# Patient Record
Sex: MALE | Race: WHITE
[De-identification: names, ages, dates, MRNs, and addresses within clinical notes are randomized per-mention and may not be internally consistent; named-entity substitution may affect disease eponyms.]

---

## 2021-04-06 ENCOUNTER — HOSPITAL ENCOUNTER (INPATIENT)
Dept: HOSPITAL 74 - YASAS | Age: 41
LOS: 4 days | Discharge: OTHER INSTITUTION | DRG: 773 | End: 2021-04-10
Attending: ALLERGY & IMMUNOLOGY | Admitting: ALLERGY & IMMUNOLOGY
Payer: COMMERCIAL

## 2021-04-06 VITALS — BODY MASS INDEX: 24.4 KG/M2

## 2021-04-06 DIAGNOSIS — F17.210: ICD-10-CM

## 2021-04-06 DIAGNOSIS — F19.24: ICD-10-CM

## 2021-04-06 DIAGNOSIS — F19.282: ICD-10-CM

## 2021-04-06 DIAGNOSIS — G40.909: ICD-10-CM

## 2021-04-06 DIAGNOSIS — F13.230: Primary | ICD-10-CM

## 2021-04-06 DIAGNOSIS — F14.20: ICD-10-CM

## 2021-04-06 DIAGNOSIS — F12.20: ICD-10-CM

## 2021-04-06 DIAGNOSIS — F19.280: ICD-10-CM

## 2021-04-06 DIAGNOSIS — F11.20: ICD-10-CM

## 2021-04-06 PROCEDURE — C9803 HOPD COVID-19 SPEC COLLECT: HCPCS

## 2021-04-06 PROCEDURE — U0005 INFEC AGEN DETEC AMPLI PROBE: HCPCS

## 2021-04-06 PROCEDURE — U0003 INFECTIOUS AGENT DETECTION BY NUCLEIC ACID (DNA OR RNA); SEVERE ACUTE RESPIRATORY SYNDROME CORONAVIRUS 2 (SARS-COV-2) (CORONAVIRUS DISEASE [COVID-19]), AMPLIFIED PROBE TECHNIQUE, MAKING USE OF HIGH THROUGHPUT TECHNOLOGIES AS DESCRIBED BY CMS-2020-01-R: HCPCS

## 2021-04-07 LAB
ALBUMIN SERPL-MCNC: 3.7 G/DL (ref 3.4–5)
ALP SERPL-CCNC: 66 U/L (ref 45–117)
ALT SERPL-CCNC: 19 U/L (ref 13–61)
ANION GAP SERPL CALC-SCNC: 7 MMOL/L (ref 8–16)
AST SERPL-CCNC: 11 U/L (ref 15–37)
BILIRUB SERPL-MCNC: 0.8 MG/DL (ref 0.2–1)
BUN SERPL-MCNC: 17.4 MG/DL (ref 7–18)
CALCIUM SERPL-MCNC: 8.8 MG/DL (ref 8.5–10.1)
CHLORIDE SERPL-SCNC: 103 MMOL/L (ref 98–107)
CO2 SERPL-SCNC: 29 MMOL/L (ref 21–32)
CREAT SERPL-MCNC: 0.9 MG/DL (ref 0.55–1.3)
DEPRECATED RDW RBC AUTO: 14.3 % (ref 11.9–15.9)
GLUCOSE SERPL-MCNC: 88 MG/DL (ref 74–106)
HCT VFR BLD CALC: 36.2 % (ref 35.4–49)
HGB BLD-MCNC: 12.3 GM/DL (ref 11.7–16.9)
MCH RBC QN AUTO: 28.3 PG (ref 25.7–33.7)
MCHC RBC AUTO-ENTMCNC: 34 G/DL (ref 32–35.9)
MCV RBC: 83.3 FL (ref 80–96)
PLATELET # BLD AUTO: 189 K/MM3 (ref 134–434)
PMV BLD: 9.7 FL (ref 7.5–11.1)
PROT SERPL-MCNC: 7 G/DL (ref 6.4–8.2)
RBC # BLD AUTO: 4.35 M/MM3 (ref 4–5.6)
SODIUM SERPL-SCNC: 139 MMOL/L (ref 136–145)
WBC # BLD AUTO: 5.9 K/MM3 (ref 4–10)

## 2021-04-07 PROCEDURE — HZ2ZZZZ DETOXIFICATION SERVICES FOR SUBSTANCE ABUSE TREATMENT: ICD-10-PCS | Performed by: ALLERGY & IMMUNOLOGY

## 2021-04-07 RX ADMIN — Medication SCH MG: at 21:05

## 2021-04-07 RX ADMIN — Medication SCH MG: at 22:05

## 2021-04-07 RX ADMIN — Medication SCH TAB: at 10:08

## 2021-04-08 RX ADMIN — BUPRENORPHINE HYDROCHLORIDE, NALOXONE HYDROCHLORIDE SCH EACH: 8; 2 FILM, SOLUBLE BUCCAL; SUBLINGUAL at 13:23

## 2021-04-08 RX ADMIN — BUPRENORPHINE HYDROCHLORIDE, NALOXONE HYDROCHLORIDE SCH EACH: 8; 2 FILM, SOLUBLE BUCCAL; SUBLINGUAL at 05:29

## 2021-04-08 RX ADMIN — Medication SCH MG: at 22:26

## 2021-04-08 RX ADMIN — BUPRENORPHINE HYDROCHLORIDE, NALOXONE HYDROCHLORIDE SCH EACH: 8; 2 FILM, SOLUBLE BUCCAL; SUBLINGUAL at 19:51

## 2021-04-08 RX ADMIN — Medication SCH MG: at 22:27

## 2021-04-08 RX ADMIN — Medication SCH TAB: at 10:07

## 2021-04-09 RX ADMIN — Medication SCH TAB: at 10:00

## 2021-04-09 RX ADMIN — Medication SCH MG: at 22:12

## 2021-04-09 RX ADMIN — BUPRENORPHINE HYDROCHLORIDE, NALOXONE HYDROCHLORIDE SCH EACH: 8; 2 FILM, SOLUBLE BUCCAL; SUBLINGUAL at 13:14

## 2021-04-09 RX ADMIN — Medication SCH MG: at 22:11

## 2021-04-09 RX ADMIN — BUPRENORPHINE HYDROCHLORIDE, NALOXONE HYDROCHLORIDE SCH EACH: 8; 2 FILM, SOLUBLE BUCCAL; SUBLINGUAL at 05:39

## 2021-04-09 RX ADMIN — BUPRENORPHINE HYDROCHLORIDE, NALOXONE HYDROCHLORIDE SCH EACH: 8; 2 FILM, SOLUBLE BUCCAL; SUBLINGUAL at 19:16

## 2021-04-10 ENCOUNTER — HOSPITAL ENCOUNTER (INPATIENT)
Dept: HOSPITAL 74 - YASAS | Age: 41
LOS: 12 days | Discharge: HOME | DRG: 772 | End: 2021-04-22
Attending: ALLERGY & IMMUNOLOGY | Admitting: ALLERGY & IMMUNOLOGY
Payer: COMMERCIAL

## 2021-04-10 VITALS — TEMPERATURE: 97.7 F | DIASTOLIC BLOOD PRESSURE: 79 MMHG | HEART RATE: 71 BPM | SYSTOLIC BLOOD PRESSURE: 138 MMHG

## 2021-04-10 DIAGNOSIS — F13.20: ICD-10-CM

## 2021-04-10 DIAGNOSIS — F12.20: ICD-10-CM

## 2021-04-10 DIAGNOSIS — F19.280: ICD-10-CM

## 2021-04-10 DIAGNOSIS — Z56.0: ICD-10-CM

## 2021-04-10 DIAGNOSIS — F32.9: ICD-10-CM

## 2021-04-10 DIAGNOSIS — F11.20: Primary | ICD-10-CM

## 2021-04-10 DIAGNOSIS — F41.9: ICD-10-CM

## 2021-04-10 DIAGNOSIS — F14.20: ICD-10-CM

## 2021-04-10 DIAGNOSIS — F19.282: ICD-10-CM

## 2021-04-10 PROCEDURE — U0005 INFEC AGEN DETEC AMPLI PROBE: HCPCS

## 2021-04-10 PROCEDURE — HZ42ZZZ GROUP COUNSELING FOR SUBSTANCE ABUSE TREATMENT, COGNITIVE-BEHAVIORAL: ICD-10-PCS | Performed by: ALLERGY & IMMUNOLOGY

## 2021-04-10 PROCEDURE — U0003 INFECTIOUS AGENT DETECTION BY NUCLEIC ACID (DNA OR RNA); SEVERE ACUTE RESPIRATORY SYNDROME CORONAVIRUS 2 (SARS-COV-2) (CORONAVIRUS DISEASE [COVID-19]), AMPLIFIED PROBE TECHNIQUE, MAKING USE OF HIGH THROUGHPUT TECHNOLOGIES AS DESCRIBED BY CMS-2020-01-R: HCPCS

## 2021-04-10 PROCEDURE — C9803 HOPD COVID-19 SPEC COLLECT: HCPCS

## 2021-04-10 RX ADMIN — BUPRENORPHINE HYDROCHLORIDE, NALOXONE HYDROCHLORIDE SCH EACH: 8; 2 FILM, SOLUBLE BUCCAL; SUBLINGUAL at 15:02

## 2021-04-10 RX ADMIN — BUPRENORPHINE HYDROCHLORIDE, NALOXONE HYDROCHLORIDE SCH EACH: 8; 2 FILM, SOLUBLE BUCCAL; SUBLINGUAL at 05:43

## 2021-04-10 RX ADMIN — HYDROXYZINE PAMOATE PRN MG: 25 CAPSULE ORAL at 21:14

## 2021-04-10 RX ADMIN — BUPRENORPHINE HYDROCHLORIDE, NALOXONE HYDROCHLORIDE SCH EACH: 8; 2 FILM, SOLUBLE BUCCAL; SUBLINGUAL at 21:13

## 2021-04-10 RX ADMIN — Medication SCH MG: at 21:13

## 2021-04-10 RX ADMIN — Medication SCH TAB: at 10:06

## 2021-04-10 RX ADMIN — Medication SCH MG: at 21:14

## 2021-04-10 SDOH — ECONOMIC STABILITY - INCOME SECURITY: UNEMPLOYMENT, UNSPECIFIED: Z56.0

## 2021-04-11 RX ADMIN — Medication SCH MG: at 21:08

## 2021-04-11 RX ADMIN — NICOTINE SCH: 7 PATCH TRANSDERMAL at 09:25

## 2021-04-11 RX ADMIN — ACETAMINOPHEN PRN MG: 325 TABLET ORAL at 15:43

## 2021-04-11 RX ADMIN — Medication SCH TAB: at 09:25

## 2021-04-11 RX ADMIN — ACETAMINOPHEN PRN MG: 325 TABLET ORAL at 06:09

## 2021-04-11 RX ADMIN — BUPRENORPHINE HYDROCHLORIDE, NALOXONE HYDROCHLORIDE SCH EACH: 8; 2 FILM, SOLUBLE BUCCAL; SUBLINGUAL at 21:09

## 2021-04-11 RX ADMIN — BUPRENORPHINE HYDROCHLORIDE, NALOXONE HYDROCHLORIDE SCH EACH: 8; 2 FILM, SOLUBLE BUCCAL; SUBLINGUAL at 06:10

## 2021-04-11 RX ADMIN — BUPRENORPHINE HYDROCHLORIDE, NALOXONE HYDROCHLORIDE SCH EACH: 8; 2 FILM, SOLUBLE BUCCAL; SUBLINGUAL at 13:29

## 2021-04-12 RX ADMIN — HYDROXYZINE PAMOATE PRN MG: 25 CAPSULE ORAL at 18:07

## 2021-04-12 RX ADMIN — Medication SCH MG: at 21:30

## 2021-04-12 RX ADMIN — HYDROXYZINE PAMOATE PRN MG: 25 CAPSULE ORAL at 22:11

## 2021-04-12 RX ADMIN — BUPRENORPHINE HYDROCHLORIDE, NALOXONE HYDROCHLORIDE SCH EACH: 8; 2 FILM, SOLUBLE BUCCAL; SUBLINGUAL at 21:30

## 2021-04-12 RX ADMIN — NICOTINE SCH: 7 PATCH TRANSDERMAL at 09:52

## 2021-04-12 RX ADMIN — BUPRENORPHINE HYDROCHLORIDE, NALOXONE HYDROCHLORIDE SCH EACH: 8; 2 FILM, SOLUBLE BUCCAL; SUBLINGUAL at 14:02

## 2021-04-12 RX ADMIN — IBUPROFEN PRN MG: 400 TABLET, FILM COATED ORAL at 09:53

## 2021-04-12 RX ADMIN — HYDROXYZINE PAMOATE PRN MG: 25 CAPSULE ORAL at 14:02

## 2021-04-12 RX ADMIN — Medication SCH TAB: at 09:52

## 2021-04-12 RX ADMIN — BUPRENORPHINE HYDROCHLORIDE, NALOXONE HYDROCHLORIDE SCH EACH: 8; 2 FILM, SOLUBLE BUCCAL; SUBLINGUAL at 06:29

## 2021-04-12 RX ADMIN — IBUPROFEN PRN MG: 400 TABLET, FILM COATED ORAL at 18:07

## 2021-04-12 RX ADMIN — HYDROXYZINE PAMOATE PRN MG: 25 CAPSULE ORAL at 09:53

## 2021-04-12 RX ADMIN — ACETAMINOPHEN PRN MG: 325 TABLET ORAL at 06:29

## 2021-04-13 RX ADMIN — Medication SCH MG: at 21:20

## 2021-04-13 RX ADMIN — HYDROXYZINE PAMOATE PRN MG: 25 CAPSULE ORAL at 09:43

## 2021-04-13 RX ADMIN — Medication SCH MG: at 21:52

## 2021-04-13 RX ADMIN — Medication SCH TAB: at 09:43

## 2021-04-13 RX ADMIN — BUSPIRONE HYDROCHLORIDE SCH MG: 10 TABLET ORAL at 12:00

## 2021-04-13 RX ADMIN — BUPRENORPHINE HYDROCHLORIDE, NALOXONE HYDROCHLORIDE SCH EACH: 8; 2 FILM, SOLUBLE BUCCAL; SUBLINGUAL at 21:20

## 2021-04-13 RX ADMIN — BUSPIRONE HYDROCHLORIDE SCH MG: 10 TABLET ORAL at 21:20

## 2021-04-13 RX ADMIN — BUPRENORPHINE HYDROCHLORIDE, NALOXONE HYDROCHLORIDE SCH EACH: 8; 2 FILM, SOLUBLE BUCCAL; SUBLINGUAL at 06:14

## 2021-04-13 RX ADMIN — BUPRENORPHINE HYDROCHLORIDE, NALOXONE HYDROCHLORIDE SCH EACH: 8; 2 FILM, SOLUBLE BUCCAL; SUBLINGUAL at 13:54

## 2021-04-13 RX ADMIN — NICOTINE SCH: 7 PATCH TRANSDERMAL at 09:43

## 2021-04-13 RX ADMIN — HYDROXYZINE PAMOATE PRN MG: 25 CAPSULE ORAL at 06:13

## 2021-04-13 RX ADMIN — HYDROXYZINE PAMOATE PRN MG: 25 CAPSULE ORAL at 02:03

## 2021-04-13 RX ADMIN — HYDROXYZINE PAMOATE PRN MG: 25 CAPSULE ORAL at 14:54

## 2021-04-14 RX ADMIN — BUPRENORPHINE HYDROCHLORIDE, NALOXONE HYDROCHLORIDE SCH EACH: 8; 2 FILM, SOLUBLE BUCCAL; SUBLINGUAL at 14:38

## 2021-04-14 RX ADMIN — BUSPIRONE HYDROCHLORIDE SCH MG: 10 TABLET ORAL at 21:12

## 2021-04-14 RX ADMIN — NICOTINE SCH: 7 PATCH TRANSDERMAL at 09:42

## 2021-04-14 RX ADMIN — Medication SCH TAB: at 09:41

## 2021-04-14 RX ADMIN — BUPRENORPHINE HYDROCHLORIDE, NALOXONE HYDROCHLORIDE SCH EACH: 8; 2 FILM, SOLUBLE BUCCAL; SUBLINGUAL at 06:40

## 2021-04-14 RX ADMIN — Medication SCH MG: at 21:12

## 2021-04-14 RX ADMIN — HYDROXYZINE PAMOATE PRN MG: 25 CAPSULE ORAL at 11:55

## 2021-04-14 RX ADMIN — BUPRENORPHINE HYDROCHLORIDE, NALOXONE HYDROCHLORIDE SCH EACH: 8; 2 FILM, SOLUBLE BUCCAL; SUBLINGUAL at 21:12

## 2021-04-14 RX ADMIN — HYDROXYZINE PAMOATE PRN MG: 25 CAPSULE ORAL at 16:04

## 2021-04-14 RX ADMIN — HYDROXYZINE PAMOATE PRN MG: 25 CAPSULE ORAL at 06:40

## 2021-04-14 RX ADMIN — BUSPIRONE HYDROCHLORIDE SCH MG: 10 TABLET ORAL at 09:42

## 2021-04-15 RX ADMIN — NICOTINE SCH: 7 PATCH TRANSDERMAL at 09:34

## 2021-04-15 RX ADMIN — HYDROXYZINE PAMOATE PRN MG: 25 CAPSULE ORAL at 14:52

## 2021-04-15 RX ADMIN — Medication SCH MG: at 21:11

## 2021-04-15 RX ADMIN — Medication SCH MG: at 21:12

## 2021-04-15 RX ADMIN — BUSPIRONE HYDROCHLORIDE SCH MG: 10 TABLET ORAL at 21:11

## 2021-04-15 RX ADMIN — Medication SCH TAB: at 09:34

## 2021-04-15 RX ADMIN — IBUPROFEN PRN MG: 400 TABLET, FILM COATED ORAL at 13:19

## 2021-04-15 RX ADMIN — HYDROXYZINE PAMOATE PRN MG: 25 CAPSULE ORAL at 10:43

## 2021-04-15 RX ADMIN — HYDROXYZINE PAMOATE PRN MG: 25 CAPSULE ORAL at 06:44

## 2021-04-15 RX ADMIN — BUSPIRONE HYDROCHLORIDE SCH MG: 10 TABLET ORAL at 09:34

## 2021-04-15 RX ADMIN — HYDROXYZINE PAMOATE PRN MG: 25 CAPSULE ORAL at 20:38

## 2021-04-15 RX ADMIN — HYDROXYZINE PAMOATE PRN MG: 25 CAPSULE ORAL at 02:51

## 2021-04-15 RX ADMIN — BUPRENORPHINE HYDROCHLORIDE, NALOXONE HYDROCHLORIDE SCH EACH: 8; 2 FILM, SOLUBLE BUCCAL; SUBLINGUAL at 06:43

## 2021-04-15 RX ADMIN — BUPRENORPHINE HYDROCHLORIDE, NALOXONE HYDROCHLORIDE SCH EACH: 8; 2 FILM, SOLUBLE BUCCAL; SUBLINGUAL at 21:14

## 2021-04-15 RX ADMIN — BUPRENORPHINE HYDROCHLORIDE, NALOXONE HYDROCHLORIDE SCH EACH: 8; 2 FILM, SOLUBLE BUCCAL; SUBLINGUAL at 13:19

## 2021-04-16 RX ADMIN — BUPRENORPHINE HYDROCHLORIDE, NALOXONE HYDROCHLORIDE SCH EACH: 8; 2 FILM, SOLUBLE BUCCAL; SUBLINGUAL at 14:12

## 2021-04-16 RX ADMIN — HYDROXYZINE PAMOATE PRN MG: 25 CAPSULE ORAL at 01:31

## 2021-04-16 RX ADMIN — BUPRENORPHINE HYDROCHLORIDE, NALOXONE HYDROCHLORIDE SCH EACH: 8; 2 FILM, SOLUBLE BUCCAL; SUBLINGUAL at 21:42

## 2021-04-16 RX ADMIN — NICOTINE SCH: 7 PATCH TRANSDERMAL at 09:30

## 2021-04-16 RX ADMIN — HYDROXYZINE PAMOATE PRN MG: 25 CAPSULE ORAL at 06:08

## 2021-04-16 RX ADMIN — BUPRENORPHINE HYDROCHLORIDE, NALOXONE HYDROCHLORIDE SCH EACH: 8; 2 FILM, SOLUBLE BUCCAL; SUBLINGUAL at 06:08

## 2021-04-16 RX ADMIN — Medication SCH TAB: at 09:30

## 2021-04-16 RX ADMIN — Medication SCH MG: at 21:41

## 2021-04-16 RX ADMIN — BUSPIRONE HYDROCHLORIDE SCH MG: 10 TABLET ORAL at 21:41

## 2021-04-16 RX ADMIN — ALUMINUM HYDROXIDE, MAGNESIUM HYDROXIDE, AND SIMETHICONE PRN ML: 200; 200; 20 SUSPENSION ORAL at 09:31

## 2021-04-16 RX ADMIN — BUSPIRONE HYDROCHLORIDE SCH MG: 10 TABLET ORAL at 09:31

## 2021-04-16 RX ADMIN — HYDROXYZINE PAMOATE PRN MG: 25 CAPSULE ORAL at 10:36

## 2021-04-16 RX ADMIN — HYDROXYZINE PAMOATE PRN MG: 25 CAPSULE ORAL at 17:17

## 2021-04-17 RX ADMIN — BUSPIRONE HYDROCHLORIDE SCH MG: 10 TABLET ORAL at 21:46

## 2021-04-17 RX ADMIN — HYDROXYZINE PAMOATE PRN MG: 25 CAPSULE ORAL at 09:29

## 2021-04-17 RX ADMIN — IBUPROFEN PRN MG: 400 TABLET, FILM COATED ORAL at 09:30

## 2021-04-17 RX ADMIN — BUPRENORPHINE HYDROCHLORIDE, NALOXONE HYDROCHLORIDE SCH EACH: 8; 2 FILM, SOLUBLE BUCCAL; SUBLINGUAL at 06:07

## 2021-04-17 RX ADMIN — HYDROXYZINE PAMOATE PRN MG: 25 CAPSULE ORAL at 14:41

## 2021-04-17 RX ADMIN — HYDROXYZINE PAMOATE PRN MG: 25 CAPSULE ORAL at 06:07

## 2021-04-17 RX ADMIN — NICOTINE SCH: 7 PATCH TRANSDERMAL at 09:30

## 2021-04-17 RX ADMIN — IBUPROFEN PRN MG: 400 TABLET, FILM COATED ORAL at 18:57

## 2021-04-17 RX ADMIN — BUSPIRONE HYDROCHLORIDE SCH MG: 10 TABLET ORAL at 09:29

## 2021-04-17 RX ADMIN — BUPRENORPHINE HYDROCHLORIDE, NALOXONE HYDROCHLORIDE SCH EACH: 8; 2 FILM, SOLUBLE BUCCAL; SUBLINGUAL at 21:49

## 2021-04-17 RX ADMIN — BUPRENORPHINE HYDROCHLORIDE, NALOXONE HYDROCHLORIDE SCH EACH: 8; 2 FILM, SOLUBLE BUCCAL; SUBLINGUAL at 13:02

## 2021-04-17 RX ADMIN — Medication SCH MG: at 21:46

## 2021-04-17 RX ADMIN — HYDROXYZINE PAMOATE PRN MG: 25 CAPSULE ORAL at 18:57

## 2021-04-17 RX ADMIN — HYDROXYZINE PAMOATE PRN MG: 25 CAPSULE ORAL at 01:55

## 2021-04-17 RX ADMIN — Medication SCH TAB: at 09:29

## 2021-04-18 RX ADMIN — IBUPROFEN PRN MG: 400 TABLET, FILM COATED ORAL at 09:48

## 2021-04-18 RX ADMIN — Medication SCH TAB: at 09:48

## 2021-04-18 RX ADMIN — BUPRENORPHINE HYDROCHLORIDE, NALOXONE HYDROCHLORIDE SCH EACH: 8; 2 FILM, SOLUBLE BUCCAL; SUBLINGUAL at 21:13

## 2021-04-18 RX ADMIN — Medication SCH MG: at 21:13

## 2021-04-18 RX ADMIN — NICOTINE SCH: 7 PATCH TRANSDERMAL at 09:48

## 2021-04-18 RX ADMIN — HYDROXYZINE PAMOATE PRN MG: 25 CAPSULE ORAL at 15:40

## 2021-04-18 RX ADMIN — ACETAMINOPHEN PRN MG: 325 TABLET ORAL at 14:17

## 2021-04-18 RX ADMIN — HYDROXYZINE PAMOATE PRN MG: 25 CAPSULE ORAL at 10:54

## 2021-04-18 RX ADMIN — HYDROXYZINE PAMOATE PRN MG: 25 CAPSULE ORAL at 01:31

## 2021-04-18 RX ADMIN — BUSPIRONE HYDROCHLORIDE SCH MG: 10 TABLET ORAL at 21:13

## 2021-04-18 RX ADMIN — BUSPIRONE HYDROCHLORIDE SCH MG: 10 TABLET ORAL at 09:48

## 2021-04-18 RX ADMIN — BUPRENORPHINE HYDROCHLORIDE, NALOXONE HYDROCHLORIDE SCH EACH: 8; 2 FILM, SOLUBLE BUCCAL; SUBLINGUAL at 06:14

## 2021-04-18 RX ADMIN — HYDROXYZINE PAMOATE PRN MG: 25 CAPSULE ORAL at 21:13

## 2021-04-18 RX ADMIN — BUPRENORPHINE HYDROCHLORIDE, NALOXONE HYDROCHLORIDE SCH EACH: 8; 2 FILM, SOLUBLE BUCCAL; SUBLINGUAL at 14:17

## 2021-04-18 RX ADMIN — HYDROXYZINE PAMOATE PRN MG: 25 CAPSULE ORAL at 06:14

## 2021-04-19 RX ADMIN — BUSPIRONE HYDROCHLORIDE SCH MG: 10 TABLET ORAL at 09:41

## 2021-04-19 RX ADMIN — HYDROXYZINE PAMOATE PRN MG: 25 CAPSULE ORAL at 14:04

## 2021-04-19 RX ADMIN — BUSPIRONE HYDROCHLORIDE SCH MG: 10 TABLET ORAL at 21:36

## 2021-04-19 RX ADMIN — IBUPROFEN PRN MG: 400 TABLET, FILM COATED ORAL at 03:49

## 2021-04-19 RX ADMIN — HYDROXYZINE PAMOATE PRN MG: 25 CAPSULE ORAL at 01:23

## 2021-04-19 RX ADMIN — Medication SCH TAB: at 09:41

## 2021-04-19 RX ADMIN — IBUPROFEN PRN MG: 400 TABLET, FILM COATED ORAL at 09:41

## 2021-04-19 RX ADMIN — BUPRENORPHINE HYDROCHLORIDE, NALOXONE HYDROCHLORIDE SCH EACH: 8; 2 FILM, SOLUBLE BUCCAL; SUBLINGUAL at 13:57

## 2021-04-19 RX ADMIN — HYDROXYZINE PAMOATE PRN MG: 25 CAPSULE ORAL at 19:50

## 2021-04-19 RX ADMIN — NICOTINE SCH: 7 PATCH TRANSDERMAL at 10:33

## 2021-04-19 RX ADMIN — HYDROXYZINE PAMOATE PRN MG: 25 CAPSULE ORAL at 09:41

## 2021-04-19 RX ADMIN — Medication SCH MG: at 21:36

## 2021-04-19 RX ADMIN — BUPRENORPHINE HYDROCHLORIDE, NALOXONE HYDROCHLORIDE SCH EACH: 8; 2 FILM, SOLUBLE BUCCAL; SUBLINGUAL at 07:17

## 2021-04-19 RX ADMIN — HYDROXYZINE PAMOATE PRN MG: 25 CAPSULE ORAL at 07:18

## 2021-04-19 RX ADMIN — BUPRENORPHINE HYDROCHLORIDE, NALOXONE HYDROCHLORIDE SCH EACH: 8; 2 FILM, SOLUBLE BUCCAL; SUBLINGUAL at 21:36

## 2021-04-19 RX ADMIN — ACETAMINOPHEN PRN MG: 325 TABLET ORAL at 14:01

## 2021-04-20 RX ADMIN — Medication SCH TAB: at 09:57

## 2021-04-20 RX ADMIN — BUPRENORPHINE HYDROCHLORIDE, NALOXONE HYDROCHLORIDE SCH EACH: 8; 2 FILM, SOLUBLE BUCCAL; SUBLINGUAL at 06:04

## 2021-04-20 RX ADMIN — IBUPROFEN PRN MG: 400 TABLET, FILM COATED ORAL at 06:05

## 2021-04-20 RX ADMIN — BUPRENORPHINE HYDROCHLORIDE, NALOXONE HYDROCHLORIDE SCH EACH: 8; 2 FILM, SOLUBLE BUCCAL; SUBLINGUAL at 22:48

## 2021-04-20 RX ADMIN — HYDROXYZINE PAMOATE PRN MG: 25 CAPSULE ORAL at 01:49

## 2021-04-20 RX ADMIN — ACETAMINOPHEN PRN MG: 325 TABLET ORAL at 18:04

## 2021-04-20 RX ADMIN — BUSPIRONE HYDROCHLORIDE SCH MG: 10 TABLET ORAL at 09:57

## 2021-04-20 RX ADMIN — HYDROXYZINE PAMOATE PRN MG: 25 CAPSULE ORAL at 06:04

## 2021-04-20 RX ADMIN — BUSPIRONE HYDROCHLORIDE SCH MG: 10 TABLET ORAL at 22:50

## 2021-04-20 RX ADMIN — HYDROXYZINE PAMOATE PRN MG: 25 CAPSULE ORAL at 09:58

## 2021-04-20 RX ADMIN — IBUPROFEN PRN MG: 400 TABLET, FILM COATED ORAL at 12:50

## 2021-04-20 RX ADMIN — HYDROXYZINE PAMOATE PRN MG: 25 CAPSULE ORAL at 18:05

## 2021-04-20 RX ADMIN — BUPRENORPHINE HYDROCHLORIDE, NALOXONE HYDROCHLORIDE SCH: 8; 2 FILM, SOLUBLE BUCCAL; SUBLINGUAL at 21:34

## 2021-04-20 RX ADMIN — Medication SCH MG: at 21:33

## 2021-04-20 RX ADMIN — BUSPIRONE HYDROCHLORIDE SCH MG: 10 TABLET ORAL at 21:33

## 2021-04-20 RX ADMIN — ALUMINUM HYDROXIDE, MAGNESIUM HYDROXIDE, AND SIMETHICONE PRN ML: 200; 200; 20 SUSPENSION ORAL at 10:32

## 2021-04-20 RX ADMIN — QUETIAPINE FUMARATE PRN MG: 100 TABLET ORAL at 22:51

## 2021-04-20 RX ADMIN — Medication SCH MG: at 22:53

## 2021-04-20 RX ADMIN — BUPRENORPHINE HYDROCHLORIDE, NALOXONE HYDROCHLORIDE SCH EACH: 8; 2 FILM, SOLUBLE BUCCAL; SUBLINGUAL at 13:54

## 2021-04-20 RX ADMIN — NICOTINE SCH: 7 PATCH TRANSDERMAL at 09:57

## 2021-04-20 RX ADMIN — Medication SCH MG: at 22:51

## 2021-04-21 RX ADMIN — IBUPROFEN PRN MG: 400 TABLET, FILM COATED ORAL at 14:37

## 2021-04-21 RX ADMIN — HYDROXYZINE PAMOATE PRN MG: 25 CAPSULE ORAL at 21:13

## 2021-04-21 RX ADMIN — BUSPIRONE HYDROCHLORIDE SCH MG: 10 TABLET ORAL at 10:06

## 2021-04-21 RX ADMIN — BUSPIRONE HYDROCHLORIDE SCH MG: 10 TABLET ORAL at 21:13

## 2021-04-21 RX ADMIN — BUPRENORPHINE HYDROCHLORIDE, NALOXONE HYDROCHLORIDE SCH EACH: 8; 2 FILM, SOLUBLE BUCCAL; SUBLINGUAL at 18:01

## 2021-04-21 RX ADMIN — HYDROXYZINE PAMOATE PRN MG: 25 CAPSULE ORAL at 14:38

## 2021-04-21 RX ADMIN — Medication SCH TAB: at 10:04

## 2021-04-21 RX ADMIN — NICOTINE SCH: 7 PATCH TRANSDERMAL at 10:06

## 2021-04-21 RX ADMIN — QUETIAPINE FUMARATE PRN MG: 100 TABLET ORAL at 21:13

## 2021-04-21 RX ADMIN — Medication SCH MG: at 21:13

## 2021-04-21 RX ADMIN — BUPRENORPHINE HYDROCHLORIDE, NALOXONE HYDROCHLORIDE SCH EACH: 8; 2 FILM, SOLUBLE BUCCAL; SUBLINGUAL at 10:04

## 2021-04-22 VITALS — HEART RATE: 95 BPM | SYSTOLIC BLOOD PRESSURE: 127 MMHG | DIASTOLIC BLOOD PRESSURE: 68 MMHG

## 2021-04-22 VITALS — TEMPERATURE: 97.9 F

## 2021-04-22 RX ADMIN — Medication SCH TAB: at 09:08

## 2021-04-22 RX ADMIN — NICOTINE SCH: 7 PATCH TRANSDERMAL at 09:08

## 2021-04-22 RX ADMIN — BUSPIRONE HYDROCHLORIDE SCH MG: 10 TABLET ORAL at 09:08

## 2021-04-22 RX ADMIN — HYDROXYZINE PAMOATE PRN MG: 25 CAPSULE ORAL at 05:55

## 2024-09-04 ENCOUNTER — APPOINTMENT (OUTPATIENT)
Dept: NEUROLOGY | Facility: HOSPITAL | Age: 44
End: 2024-09-04
Payer: COMMERCIAL

## 2024-09-04 ENCOUNTER — APPOINTMENT (OUTPATIENT)
Dept: RADIOLOGY | Facility: HOSPITAL | Age: 44
End: 2024-09-04
Payer: COMMERCIAL

## 2024-09-04 ENCOUNTER — APPOINTMENT (OUTPATIENT)
Dept: CARDIOLOGY | Facility: HOSPITAL | Age: 44
End: 2024-09-04
Payer: COMMERCIAL

## 2024-09-04 ENCOUNTER — HOSPITAL ENCOUNTER (OUTPATIENT)
Facility: HOSPITAL | Age: 44
Setting detail: OBSERVATION
Discharge: HOME | End: 2024-09-05
Attending: EMERGENCY MEDICINE | Admitting: INTERNAL MEDICINE
Payer: COMMERCIAL

## 2024-09-04 DIAGNOSIS — R56.9 SEIZURE (MULTI): ICD-10-CM

## 2024-09-04 DIAGNOSIS — R56.9 SEIZURE-LIKE ACTIVITY (MULTI): Primary | ICD-10-CM

## 2024-09-04 DIAGNOSIS — R79.89 ELEVATED TROPONIN: ICD-10-CM

## 2024-09-04 LAB
ALBUMIN SERPL BCP-MCNC: 4 G/DL (ref 3.4–5)
ALP SERPL-CCNC: 56 U/L (ref 33–120)
ALT SERPL W P-5'-P-CCNC: 28 U/L (ref 10–52)
ANION GAP BLDV CALCULATED.4IONS-SCNC: 10 MMOL/L (ref 10–25)
ANION GAP SERPL CALC-SCNC: 13 MMOL/L (ref 10–20)
APPEARANCE UR: CLEAR
AST SERPL W P-5'-P-CCNC: 29 U/L (ref 9–39)
BASE EXCESS BLDV CALC-SCNC: 1.3 MMOL/L (ref -2–3)
BASOPHILS # BLD AUTO: 0.03 X10*3/UL (ref 0–0.1)
BASOPHILS NFR BLD AUTO: 0.3 %
BILIRUB SERPL-MCNC: 0.6 MG/DL (ref 0–1.2)
BILIRUB UR STRIP.AUTO-MCNC: NEGATIVE MG/DL
BODY TEMPERATURE: 37 DEGREES CELSIUS
BUN SERPL-MCNC: 15 MG/DL (ref 6–23)
CA-I BLDV-SCNC: 1.19 MMOL/L (ref 1.1–1.33)
CALCIUM SERPL-MCNC: 8.5 MG/DL (ref 8.6–10.3)
CARDIAC TROPONIN I PNL SERPL HS: 18 NG/L (ref 0–20)
CARDIAC TROPONIN I PNL SERPL HS: 25 NG/L (ref 0–20)
CARDIAC TROPONIN I PNL SERPL HS: 31 NG/L (ref 0–20)
CHLORIDE BLDV-SCNC: 103 MMOL/L (ref 98–107)
CHLORIDE SERPL-SCNC: 102 MMOL/L (ref 98–107)
CK SERPL-CCNC: 302 U/L (ref 0–325)
CO2 SERPL-SCNC: 26 MMOL/L (ref 21–32)
COLOR UR: YELLOW
CREAT SERPL-MCNC: 0.81 MG/DL (ref 0.5–1.3)
EGFRCR SERPLBLD CKD-EPI 2021: >90 ML/MIN/1.73M*2
EOSINOPHIL # BLD AUTO: 0.03 X10*3/UL (ref 0–0.7)
EOSINOPHIL NFR BLD AUTO: 0.3 %
ERYTHROCYTE [DISTWIDTH] IN BLOOD BY AUTOMATED COUNT: 12.9 % (ref 11.5–14.5)
GLUCOSE BLDV-MCNC: 98 MG/DL (ref 74–99)
GLUCOSE SERPL-MCNC: 82 MG/DL (ref 74–99)
GLUCOSE UR STRIP.AUTO-MCNC: NORMAL MG/DL
HCO3 BLDV-SCNC: 27.2 MMOL/L (ref 22–26)
HCT VFR BLD AUTO: 38.7 % (ref 41–52)
HCT VFR BLD EST: 39 % (ref 41–52)
HGB BLD-MCNC: 12.8 G/DL (ref 13.5–17.5)
HGB BLDV-MCNC: 12.9 G/DL (ref 13.5–17.5)
IMM GRANULOCYTES # BLD AUTO: 0.06 X10*3/UL (ref 0–0.7)
IMM GRANULOCYTES NFR BLD AUTO: 0.7 % (ref 0–0.9)
INHALED O2 CONCENTRATION: 21 %
KETONES UR STRIP.AUTO-MCNC: NEGATIVE MG/DL
LACTATE BLDV-SCNC: 2 MMOL/L (ref 0.4–2)
LACTATE SERPL-SCNC: 0.6 MMOL/L (ref 0.4–2)
LACTATE SERPL-SCNC: 2.4 MMOL/L (ref 0.4–2)
LEUKOCYTE ESTERASE UR QL STRIP.AUTO: NEGATIVE
LYMPHOCYTES # BLD AUTO: 1.17 X10*3/UL (ref 1.2–4.8)
LYMPHOCYTES NFR BLD AUTO: 13 %
MAGNESIUM SERPL-MCNC: 1.9 MG/DL (ref 1.6–2.4)
MCH RBC QN AUTO: 28.5 PG (ref 26–34)
MCHC RBC AUTO-ENTMCNC: 33.1 G/DL (ref 32–36)
MCV RBC AUTO: 86 FL (ref 80–100)
MONOCYTES # BLD AUTO: 0.31 X10*3/UL (ref 0.1–1)
MONOCYTES NFR BLD AUTO: 3.4 %
NEUTROPHILS # BLD AUTO: 7.39 X10*3/UL (ref 1.2–7.7)
NEUTROPHILS NFR BLD AUTO: 82.3 %
NITRITE UR QL STRIP.AUTO: NEGATIVE
NRBC BLD-RTO: 0 /100 WBCS (ref 0–0)
OXYHGB MFR BLDV: 57.5 % (ref 45–75)
PCO2 BLDV: 47 MM HG (ref 41–51)
PH BLDV: 7.37 PH (ref 7.33–7.43)
PH UR STRIP.AUTO: 7 [PH]
PLATELET # BLD AUTO: 210 X10*3/UL (ref 150–450)
PO2 BLDV: 38 MM HG (ref 35–45)
POTASSIUM BLDV-SCNC: 4.8 MMOL/L (ref 3.5–5.3)
POTASSIUM SERPL-SCNC: 4.1 MMOL/L (ref 3.5–5.3)
PROT SERPL-MCNC: 6.8 G/DL (ref 6.4–8.2)
PROT UR STRIP.AUTO-MCNC: NEGATIVE MG/DL
RBC # BLD AUTO: 4.49 X10*6/UL (ref 4.5–5.9)
RBC # UR STRIP.AUTO: NEGATIVE /UL
SAO2 % BLDV: 58 % (ref 45–75)
SODIUM BLDV-SCNC: 135 MMOL/L (ref 136–145)
SODIUM SERPL-SCNC: 137 MMOL/L (ref 136–145)
SP GR UR STRIP.AUTO: 1.02
UROBILINOGEN UR STRIP.AUTO-MCNC: NORMAL MG/DL
WBC # BLD AUTO: 9 X10*3/UL (ref 4.4–11.3)

## 2024-09-04 PROCEDURE — 2500000001 HC RX 250 WO HCPCS SELF ADMINISTERED DRUGS (ALT 637 FOR MEDICARE OP): Performed by: EMERGENCY MEDICINE

## 2024-09-04 PROCEDURE — 71045 X-RAY EXAM CHEST 1 VIEW: CPT

## 2024-09-04 PROCEDURE — 84075 ASSAY ALKALINE PHOSPHATASE: CPT | Performed by: EMERGENCY MEDICINE

## 2024-09-04 PROCEDURE — 80051 ELECTROLYTE PANEL: CPT | Performed by: EMERGENCY MEDICINE

## 2024-09-04 PROCEDURE — 85025 COMPLETE CBC W/AUTO DIFF WBC: CPT | Performed by: EMERGENCY MEDICINE

## 2024-09-04 PROCEDURE — 99285 EMERGENCY DEPT VISIT HI MDM: CPT | Mod: 25

## 2024-09-04 PROCEDURE — 99222 1ST HOSP IP/OBS MODERATE 55: CPT

## 2024-09-04 PROCEDURE — 71045 X-RAY EXAM CHEST 1 VIEW: CPT | Performed by: RADIOLOGY

## 2024-09-04 PROCEDURE — 2500000001 HC RX 250 WO HCPCS SELF ADMINISTERED DRUGS (ALT 637 FOR MEDICARE OP)

## 2024-09-04 PROCEDURE — 95819 EEG AWAKE AND ASLEEP: CPT

## 2024-09-04 PROCEDURE — G0378 HOSPITAL OBSERVATION PER HR: HCPCS

## 2024-09-04 PROCEDURE — 36415 COLL VENOUS BLD VENIPUNCTURE: CPT | Performed by: EMERGENCY MEDICINE

## 2024-09-04 PROCEDURE — 84132 ASSAY OF SERUM POTASSIUM: CPT | Performed by: EMERGENCY MEDICINE

## 2024-09-04 PROCEDURE — 2500000004 HC RX 250 GENERAL PHARMACY W/ HCPCS (ALT 636 FOR OP/ED): Performed by: EMERGENCY MEDICINE

## 2024-09-04 PROCEDURE — 84484 ASSAY OF TROPONIN QUANT: CPT

## 2024-09-04 PROCEDURE — 95819 EEG AWAKE AND ASLEEP: CPT | Performed by: PSYCHIATRY & NEUROLOGY

## 2024-09-04 PROCEDURE — 93005 ELECTROCARDIOGRAM TRACING: CPT

## 2024-09-04 PROCEDURE — A9575 INJ GADOTERATE MEGLUMI 0.1ML: HCPCS | Performed by: INTERNAL MEDICINE

## 2024-09-04 PROCEDURE — 83735 ASSAY OF MAGNESIUM: CPT | Performed by: EMERGENCY MEDICINE

## 2024-09-04 PROCEDURE — 84484 ASSAY OF TROPONIN QUANT: CPT | Performed by: EMERGENCY MEDICINE

## 2024-09-04 PROCEDURE — 82550 ASSAY OF CK (CPK): CPT | Performed by: EMERGENCY MEDICINE

## 2024-09-04 PROCEDURE — 81003 URINALYSIS AUTO W/O SCOPE: CPT | Performed by: EMERGENCY MEDICINE

## 2024-09-04 PROCEDURE — 96360 HYDRATION IV INFUSION INIT: CPT

## 2024-09-04 PROCEDURE — 83605 ASSAY OF LACTIC ACID: CPT | Performed by: EMERGENCY MEDICINE

## 2024-09-04 PROCEDURE — 2500000002 HC RX 250 W HCPCS SELF ADMINISTERED DRUGS (ALT 637 FOR MEDICARE OP, ALT 636 FOR OP/ED)

## 2024-09-04 PROCEDURE — 70553 MRI BRAIN STEM W/O & W/DYE: CPT | Performed by: SURGERY

## 2024-09-04 PROCEDURE — 70450 CT HEAD/BRAIN W/O DYE: CPT

## 2024-09-04 PROCEDURE — 2550000001 HC RX 255 CONTRASTS: Performed by: INTERNAL MEDICINE

## 2024-09-04 PROCEDURE — 70450 CT HEAD/BRAIN W/O DYE: CPT | Performed by: RADIOLOGY

## 2024-09-04 PROCEDURE — 70553 MRI BRAIN STEM W/O & W/DYE: CPT

## 2024-09-04 RX ORDER — BUPRENORPHINE AND NALOXONE 8; 2 MG/1; MG/1
1 FILM, SOLUBLE BUCCAL; SUBLINGUAL 3 TIMES DAILY
Status: DISCONTINUED | OUTPATIENT
Start: 2024-09-04 | End: 2024-09-05 | Stop reason: HOSPADM

## 2024-09-04 RX ORDER — ALPRAZOLAM 0.5 MG/1
0.5 TABLET ORAL ONCE
Status: COMPLETED | OUTPATIENT
Start: 2024-09-04 | End: 2024-09-04

## 2024-09-04 RX ORDER — GABAPENTIN 400 MG/1
400 CAPSULE ORAL DAILY
COMMUNITY

## 2024-09-04 RX ORDER — ACETAMINOPHEN 650 MG/1
650 SUPPOSITORY RECTAL EVERY 4 HOURS PRN
Status: DISCONTINUED | OUTPATIENT
Start: 2024-09-04 | End: 2024-09-05 | Stop reason: HOSPADM

## 2024-09-04 RX ORDER — BUPRENORPHINE AND NALOXONE 8; 2 MG/1; MG/1
1 FILM, SOLUBLE BUCCAL; SUBLINGUAL 3 TIMES DAILY
COMMUNITY

## 2024-09-04 RX ORDER — DIAZEPAM 5 MG/1
5 TABLET ORAL DAILY
Status: DISCONTINUED | OUTPATIENT
Start: 2024-09-04 | End: 2024-09-04

## 2024-09-04 RX ORDER — ATOMOXETINE 40 MG/1
40 CAPSULE ORAL DAILY
Status: DISCONTINUED | OUTPATIENT
Start: 2024-09-04 | End: 2024-09-05 | Stop reason: HOSPADM

## 2024-09-04 RX ORDER — POLYETHYLENE GLYCOL 3350 17 G/17G
17 POWDER, FOR SOLUTION ORAL DAILY
Status: DISCONTINUED | OUTPATIENT
Start: 2024-09-04 | End: 2024-09-05 | Stop reason: HOSPADM

## 2024-09-04 RX ORDER — ONDANSETRON 4 MG/1
4 TABLET, FILM COATED ORAL EVERY 8 HOURS PRN
Status: DISCONTINUED | OUTPATIENT
Start: 2024-09-04 | End: 2024-09-05 | Stop reason: HOSPADM

## 2024-09-04 RX ORDER — DIAZEPAM 5 MG/1
5 TABLET ORAL DAILY
COMMUNITY

## 2024-09-04 RX ORDER — BUPROPION HYDROCHLORIDE 150 MG/1
150 TABLET ORAL NIGHTLY
Status: DISCONTINUED | OUTPATIENT
Start: 2024-09-04 | End: 2024-09-05 | Stop reason: HOSPADM

## 2024-09-04 RX ORDER — TALC
3 POWDER (GRAM) TOPICAL NIGHTLY PRN
Status: DISCONTINUED | OUTPATIENT
Start: 2024-09-04 | End: 2024-09-05 | Stop reason: HOSPADM

## 2024-09-04 RX ORDER — NAPROXEN SODIUM 220 MG/1
324 TABLET, FILM COATED ORAL ONCE
Status: COMPLETED | OUTPATIENT
Start: 2024-09-04 | End: 2024-09-04

## 2024-09-04 RX ORDER — ACETAMINOPHEN 325 MG/1
650 TABLET ORAL EVERY 4 HOURS PRN
Status: DISCONTINUED | OUTPATIENT
Start: 2024-09-04 | End: 2024-09-05 | Stop reason: HOSPADM

## 2024-09-04 RX ORDER — BUPROPION HYDROCHLORIDE 150 MG/1
150 TABLET ORAL NIGHTLY
COMMUNITY

## 2024-09-04 RX ORDER — MIRTAZAPINE 15 MG/1
15 TABLET, FILM COATED ORAL DAILY
Status: DISCONTINUED | OUTPATIENT
Start: 2024-09-04 | End: 2024-09-05 | Stop reason: HOSPADM

## 2024-09-04 RX ORDER — ALPRAZOLAM 0.5 MG/1
0.5 TABLET ORAL DAILY PRN
Status: DISCONTINUED | OUTPATIENT
Start: 2024-09-04 | End: 2024-09-05 | Stop reason: HOSPADM

## 2024-09-04 RX ORDER — GADOTERATE MEGLUMINE 376.9 MG/ML
20 INJECTION INTRAVENOUS
Status: COMPLETED | OUTPATIENT
Start: 2024-09-04 | End: 2024-09-04

## 2024-09-04 RX ORDER — ONDANSETRON HYDROCHLORIDE 2 MG/ML
4 INJECTION, SOLUTION INTRAVENOUS EVERY 8 HOURS PRN
Status: DISCONTINUED | OUTPATIENT
Start: 2024-09-04 | End: 2024-09-05 | Stop reason: HOSPADM

## 2024-09-04 RX ORDER — MIRTAZAPINE 15 MG/1
15 TABLET, FILM COATED ORAL DAILY
COMMUNITY

## 2024-09-04 RX ORDER — ACETAMINOPHEN 160 MG/5ML
650 SOLUTION ORAL EVERY 4 HOURS PRN
Status: DISCONTINUED | OUTPATIENT
Start: 2024-09-04 | End: 2024-09-05 | Stop reason: HOSPADM

## 2024-09-04 RX ORDER — ATOMOXETINE 40 MG/1
40 CAPSULE ORAL DAILY
COMMUNITY

## 2024-09-04 RX ORDER — GABAPENTIN 400 MG/1
400 CAPSULE ORAL DAILY
Status: DISCONTINUED | OUTPATIENT
Start: 2024-09-04 | End: 2024-09-05 | Stop reason: HOSPADM

## 2024-09-04 SDOH — SOCIAL STABILITY: SOCIAL INSECURITY: HAVE YOU HAD THOUGHTS OF HARMING ANYONE ELSE?: NO

## 2024-09-04 SDOH — SOCIAL STABILITY: SOCIAL INSECURITY: HAVE YOU HAD ANY THOUGHTS OF HARMING ANYONE ELSE?: NO

## 2024-09-04 SDOH — SOCIAL STABILITY: SOCIAL INSECURITY: DO YOU FEEL UNSAFE GOING BACK TO THE PLACE WHERE YOU ARE LIVING?: NO

## 2024-09-04 SDOH — SOCIAL STABILITY: SOCIAL INSECURITY: HAS ANYONE EVER THREATENED TO HURT YOUR FAMILY OR YOUR PETS?: NO

## 2024-09-04 SDOH — SOCIAL STABILITY: SOCIAL INSECURITY: ARE YOU OR HAVE YOU BEEN THREATENED OR ABUSED PHYSICALLY, EMOTIONALLY, OR SEXUALLY BY ANYONE?: NO

## 2024-09-04 SDOH — SOCIAL STABILITY: SOCIAL INSECURITY: ABUSE: ADULT

## 2024-09-04 SDOH — SOCIAL STABILITY: SOCIAL INSECURITY: DO YOU FEEL ANYONE HAS EXPLOITED OR TAKEN ADVANTAGE OF YOU FINANCIALLY OR OF YOUR PERSONAL PROPERTY?: NO

## 2024-09-04 SDOH — SOCIAL STABILITY: SOCIAL INSECURITY: DOES ANYONE TRY TO KEEP YOU FROM HAVING/CONTACTING OTHER FRIENDS OR DOING THINGS OUTSIDE YOUR HOME?: NO

## 2024-09-04 SDOH — SOCIAL STABILITY: SOCIAL INSECURITY: ARE THERE ANY APPARENT SIGNS OF INJURIES/BEHAVIORS THAT COULD BE RELATED TO ABUSE/NEGLECT?: NO

## 2024-09-04 SDOH — SOCIAL STABILITY: SOCIAL INSECURITY: WERE YOU ABLE TO COMPLETE ALL THE BEHAVIORAL HEALTH SCREENINGS?: YES

## 2024-09-04 ASSESSMENT — ENCOUNTER SYMPTOMS
FATIGUE: 0
CHEST TIGHTNESS: 0
LIGHT-HEADEDNESS: 1
ACTIVITY CHANGE: 0
APPETITE CHANGE: 0
DIZZINESS: 1
FEVER: 0
SHORTNESS OF BREATH: 0
BACK PAIN: 1
PALPITATIONS: 0
NAUSEA: 0
VOMITING: 0

## 2024-09-04 ASSESSMENT — ACTIVITIES OF DAILY LIVING (ADL)
PATIENT'S MEMORY ADEQUATE TO SAFELY COMPLETE DAILY ACTIVITIES?: YES
GROOMING: INDEPENDENT
HEARING - LEFT EAR: FUNCTIONAL
TOILETING: INDEPENDENT
BATHING: INDEPENDENT
WALKS IN HOME: INDEPENDENT
JUDGMENT_ADEQUATE_SAFELY_COMPLETE_DAILY_ACTIVITIES: YES
FEEDING YOURSELF: INDEPENDENT
DRESSING YOURSELF: INDEPENDENT
ADEQUATE_TO_COMPLETE_ADL: YES
HEARING - RIGHT EAR: FUNCTIONAL
LACK_OF_TRANSPORTATION: NO

## 2024-09-04 ASSESSMENT — PATIENT HEALTH QUESTIONNAIRE - PHQ9
1. LITTLE INTEREST OR PLEASURE IN DOING THINGS: NOT AT ALL
SUM OF ALL RESPONSES TO PHQ9 QUESTIONS 1 & 2: 0
2. FEELING DOWN, DEPRESSED OR HOPELESS: NOT AT ALL

## 2024-09-04 ASSESSMENT — COGNITIVE AND FUNCTIONAL STATUS - GENERAL
PATIENT BASELINE BEDBOUND: NO
DAILY ACTIVITIY SCORE: 24
MOBILITY SCORE: 24

## 2024-09-04 ASSESSMENT — LIFESTYLE VARIABLES
SUBSTANCE_ABUSE_PAST_12_MONTHS: YES
AUDIT-C TOTAL SCORE: 0
PRESCIPTION_ABUSE_PAST_12_MONTHS: NO
AUDIT-C TOTAL SCORE: 0
HOW OFTEN DO YOU HAVE 6 OR MORE DRINKS ON ONE OCCASION: NEVER
HOW OFTEN DO YOU HAVE A DRINK CONTAINING ALCOHOL: NEVER
SKIP TO QUESTIONS 9-10: 1
HOW MANY STANDARD DRINKS CONTAINING ALCOHOL DO YOU HAVE ON A TYPICAL DAY: PATIENT DOES NOT DRINK

## 2024-09-04 ASSESSMENT — COLUMBIA-SUICIDE SEVERITY RATING SCALE - C-SSRS
2. HAVE YOU ACTUALLY HAD ANY THOUGHTS OF KILLING YOURSELF?: NO
6. HAVE YOU EVER DONE ANYTHING, STARTED TO DO ANYTHING, OR PREPARED TO DO ANYTHING TO END YOUR LIFE?: NO
1. IN THE PAST MONTH, HAVE YOU WISHED YOU WERE DEAD OR WISHED YOU COULD GO TO SLEEP AND NOT WAKE UP?: NO

## 2024-09-04 ASSESSMENT — PAIN SCALES - GENERAL
PAINLEVEL_OUTOF10: 0 - NO PAIN
PAINLEVEL_OUTOF10: 0 - NO PAIN

## 2024-09-04 ASSESSMENT — PAIN - FUNCTIONAL ASSESSMENT: PAIN_FUNCTIONAL_ASSESSMENT: 0-10

## 2024-09-04 NOTE — H&P
History Of Present Illness  Chris Zhong is a 44 y.o. male with PMHx of opiate use disorder - on Suboxone and anxiety who is admitted to observation with seizure -like activity. Pt said this morning after he got out of the shower he felt a dizzy and also experienced a feeling hard to describe but like he was scared. This subsided but then was felt again on his drive to work. Once at work he was sitting in a chair and lost consciousness. His cousin who was with him stated he was shaking and would not wake for a couple minutes. Pt does not remember this event and woke up in the ambulance. He states he had a seizure in the past but it was approximately 7 years ago due to opiate withdrawal. He denies CP, SOB, palpitations, N/V.   ED course: VSS  Labs: CMP/CBC largely unremarkable. Lactate 2.4. Troponin 25. VBG WNL. UA negative. CT head - no acute abnormality. CXR - no acute cardiopulmonary process. EKG NSR      Past Medical History  Opiate use disorder - on suboxone     Surgical History  He has no past surgical history on file.     Social History  Does smoke weed and use a vape, does not use any other non-prescription drugs   Does not smoke cigarettes   Does not drink alcohol  History of opiate use - has not used in over 5 years   Family History  No family history on file.     Allergies  Patient has no known allergies.    Review of Systems   Constitutional:  Negative for activity change, appetite change, fatigue and fever.   Respiratory:  Negative for chest tightness and shortness of breath.    Cardiovascular:  Positive for leg swelling (Did have mild swelling in wrists and ankles a couple weeks ago). Negative for chest pain and palpitations.   Gastrointestinal:  Negative for nausea and vomiting.   Musculoskeletal:  Positive for back pain.   Neurological:  Positive for dizziness and light-headedness.        Seizure-like activity, pt lost consciousness at work today        Physical Exam  Constitutional:       General: He is  not in acute distress.     Appearance: Normal appearance. He is normal weight. He is not ill-appearing or toxic-appearing.   HENT:      Head: Normocephalic and atraumatic.   Cardiovascular:      Rate and Rhythm: Normal rate and regular rhythm.      Pulses: Normal pulses.      Heart sounds: Normal heart sounds. No murmur heard.     No friction rub. No gallop.   Pulmonary:      Effort: Pulmonary effort is normal. No respiratory distress.      Breath sounds: Normal breath sounds. No wheezing or rhonchi.   Abdominal:      General: Abdomen is flat. There is no distension.      Palpations: Abdomen is soft. There is no mass.      Tenderness: There is no abdominal tenderness.      Hernia: No hernia is present.   Musculoskeletal:         General: Normal range of motion.      Cervical back: Normal range of motion and neck supple.      Right lower leg: No edema.      Left lower leg: No edema.   Skin:     General: Skin is warm and dry.   Neurological:      General: No focal deficit present.      Mental Status: He is alert and oriented to person, place, and time.      Cranial Nerves: No cranial nerve deficit.      Sensory: No sensory deficit.      Motor: No weakness.      Coordination: Coordination normal.   Psychiatric:         Mood and Affect: Mood normal.         Behavior: Behavior normal.          Last Recorded Vitals  /76   Pulse 69   Temp 36.8 °C (98.2 °F) (Oral)   Resp 15   Wt 90.7 kg (200 lb)   SpO2 97%     Relevant Results    Scheduled medications  atomoxetine, 40 mg, oral, Daily  buprenorphine-naloxone, 1 Film, sublingual, TID  buPROPion XL, 150 mg, oral, Nightly  gabapentin, 400 mg, oral, Daily  mirtazapine, 15 mg, oral, Daily  polyethylene glycol, 17 g, oral, Daily      Continuous medications     PRN medications  PRN medications: acetaminophen **OR** acetaminophen **OR** acetaminophen, ALPRAZolam, melatonin, ondansetron **OR** ondansetron  CT head wo IV contrast    Result Date: 9/4/2024  Interpreted By:   Logan Perez, STUDY: CT HEAD WO IV CONTRAST;  9/4/2024 9:37 am   INDICATION: Signs/Symptoms:c/f first time seizure.   COMPARISON: None.   ACCESSION NUMBER(S): [id number]   ORDERING CLINICIAN: ALICE TRUJILLO   TECHNIQUE: Noncontrast axial CT scan of head was performed. Multiplanar reconstructions   FINDINGS: No acute intracranial hemorrhage, mass effect, midline shift, or herniation. No evidence of hydrocephalus. Cerebellar tonsillar ectopia. The ventricles and sulci are unremarkable for age. The visualized paranasal sinuses and mastoid air cells are clear. No acute osseous abnormality of the calvarium. No destructive bone lesion.       No acute intracranial abnormality. Consider follow-up with MRI as warranted.   Cerebellar tonsillar ectopia.     Signed by: Logan Perez 9/4/2024 10:14 AM Dictation workstation:   [id number]    XR chest 1 view    Result Date: 9/4/2024  Interpreted By:  Marge Ann, STUDY: XR CHEST 1 VIEW;  9/4/2024 9:15 am   INDICATION: Signs/Symptoms:syncope vs seizure.   COMPARISON: None.   ACCESSION NUMBER(S): [id number]   ORDERING CLINICIAN: ALICE TRUJILLO   FINDINGS: CARDIOMEDIASTINAL SILHOUETTE: Cardiomediastinal silhouette is normal in size and configuration.     LUNGS: Lungs are clear.   ABDOMEN: No remarkable upper abdominal findings.     BONES: No acute osseous changes.       No acute cardiopulmonary process.   MACRO: None   Signed by: Marge Ann 9/4/2024 9:19 AM Dictation workstation:   [id number]   Results for orders placed or performed during the hospital encounter of 09/04/24 (from the past 24 hour(s))   CBC and Auto Differential   Result Value Ref Range    WBC 9.0 4.4 - 11.3 x10*3/uL    nRBC 0.0 0.0 - 0.0 /100 WBCs    RBC 4.49 (L) 4.50 - 5.90 x10*6/uL    Hemoglobin 12.8 (L) 13.5 - 17.5 g/dL    Hematocrit 38.7 (L) 41.0 - 52.0 %    MCV 86 80 - 100 fL    MCH 28.5 26.0 - 34.0 pg    MCHC 33.1 32.0 - 36.0 g/dL    RDW 12.9 11.5 - 14.5 %    Platelets 210 150 - 450  x10*3/uL    Neutrophils % 82.3 40.0 - 80.0 %    Immature Granulocytes %, Automated 0.7 0.0 - 0.9 %    Lymphocytes % 13.0 13.0 - 44.0 %    Monocytes % 3.4 2.0 - 10.0 %    Eosinophils % 0.3 0.0 - 6.0 %    Basophils % 0.3 0.0 - 2.0 %    Neutrophils Absolute 7.39 1.20 - 7.70 x10*3/uL    Immature Granulocytes Absolute, Automated 0.06 0.00 - 0.70 x10*3/uL    Lymphocytes Absolute 1.17 (L) 1.20 - 4.80 x10*3/uL    Monocytes Absolute 0.31 0.10 - 1.00 x10*3/uL    Eosinophils Absolute 0.03 0.00 - 0.70 x10*3/uL    Basophils Absolute 0.03 0.00 - 0.10 x10*3/uL   Comprehensive metabolic panel   Result Value Ref Range    Glucose 82 74 - 99 mg/dL    Sodium 137 136 - 145 mmol/L    Potassium 4.1 3.5 - 5.3 mmol/L    Chloride 102 98 - 107 mmol/L    Bicarbonate 26 21 - 32 mmol/L    Anion Gap 13 10 - 20 mmol/L    Urea Nitrogen 15 6 - 23 mg/dL    Creatinine 0.81 0.50 - 1.30 mg/dL    eGFR >90 >60 mL/min/1.73m*2    Calcium 8.5 (L) 8.6 - 10.3 mg/dL    Albumin 4.0 3.4 - 5.0 g/dL    Alkaline Phosphatase 56 33 - 120 U/L    Total Protein 6.8 6.4 - 8.2 g/dL    AST 29 9 - 39 U/L    Bilirubin, Total 0.6 0.0 - 1.2 mg/dL    ALT 28 10 - 52 U/L   Creatine Kinase   Result Value Ref Range    Creatine Kinase 302 0 - 325 U/L   Lactate   Result Value Ref Range    Lactate 2.4 (H) 0.4 - 2.0 mmol/L   Magnesium   Result Value Ref Range    Magnesium 1.90 1.60 - 2.40 mg/dL   Troponin I, High Sensitivity   Result Value Ref Range    Troponin I, High Sensitivity 25 (H) 0 - 20 ng/L   BLOOD GAS VENOUS FULL PANEL   Result Value Ref Range    POCT pH, Venous 7.37 7.33 - 7.43 pH    POCT pCO2, Venous 47 41 - 51 mm Hg    POCT pO2, Venous 38 35 - 45 mm Hg    POCT SO2, Venous 58 45 - 75 %    POCT Oxy Hemoglobin, Venous 57.5 45.0 - 75.0 %    POCT Hematocrit Calculated, Venous 39.0 (L) 41.0 - 52.0 %    POCT Sodium, Venous 135 (L) 136 - 145 mmol/L    POCT Potassium, Venous 4.8 3.5 - 5.3 mmol/L    POCT Chloride, Venous 103 98 - 107 mmol/L    POCT Ionized Calicum, Venous 1.19 1.10 -  1.33 mmol/L    POCT Glucose, Venous 98 74 - 99 mg/dL    POCT Lactate, Venous 2.0 0.4 - 2.0 mmol/L    POCT Base Excess, Venous 1.3 -2.0 - 3.0 mmol/L    POCT HCO3 Calculated, Venous 27.2 (H) 22.0 - 26.0 mmol/L    POCT Hemoglobin, Venous 12.9 (L) 13.5 - 17.5 g/dL    POCT Anion Gap, Venous 10.0 10.0 - 25.0 mmol/L    Patient Temperature 37.0 degrees Celsius    FiO2 21 %   Lactate   Result Value Ref Range    Lactate 0.6 0.4 - 2.0 mmol/L   Troponin I, High Sensitivity   Result Value Ref Range    Troponin I, High Sensitivity 31 (H) 0 - 20 ng/L   Urinalysis with Reflex Culture and Microscopic   Result Value Ref Range    Color, Urine Yellow Light-Yellow, Yellow, Dark-Yellow    Appearance, Urine Clear Clear    Specific Gravity, Urine 1.025 1.005 - 1.035    pH, Urine 7.0 5.0, 5.5, 6.0, 6.5, 7.0, 7.5, 8.0    Protein, Urine NEGATIVE NEGATIVE, 10 (TRACE), 20 (TRACE) mg/dL    Glucose, Urine Normal Normal mg/dL    Blood, Urine NEGATIVE NEGATIVE    Ketones, Urine NEGATIVE NEGATIVE mg/dL    Bilirubin, Urine NEGATIVE NEGATIVE    Urobilinogen, Urine Normal Normal mg/dL    Nitrite, Urine NEGATIVE NEGATIVE    Leukocyte Esterase, Urine NEGATIVE NEGATIVE            Assessment/Plan   Assessment & Plan  Seizure-like activity (Multi)  Chris Zhong is a 44 y.o. male with PMHx of opiate use disorder - on Suboxone and anxiety who is admitted to observation with seizure -like activity. Pt said this morning after he got out of the shower he felt a dizzy and also experienced a feeling hard to describe but like he was scared. This subsided but then was felt again on his drive to work. Once at work he was sitting in a chair and lost consciousness. His cousin who was with him stated he was shaking and would not wake for a couple minutes. Pt does not remember this event and woke up in the ambulance. He states he had a seizure in the past but it was approximately 7 years ago due to opiate withdrawal. He denies CP, SOB, palpitations, N/V.   ED course:  VSS  Labs: CMP/CBC largely unremarkable. Lactate 2.4. Troponin 25. VBG WNL. UA negative. CT head - no acute abnormality. CXR - no acute cardiopulmonary process. EKG NSR     Seizure-like activity vs convulsive syncopy?  - CT head negative   - Neurology recommending MRI brain w and wo and EEG, consult neuro officially if abnormal results per ED note   - EKG normal sinus rhythm  - Tele   - Seizure precautions   - Consider echo     Opiate Use disorder  - Continue Suboxone   - Per pt - does not take Valium 5mg as stated on med rec but takes 0.5 Xanax, PRN Xanax ordered for now, can double check with pharmacy tomorrow if needed     Anxiety  - Continue Bupropion    VTE/GI prophylaxis  - Low DVT risk  - PRN miralax     Discharge planning  - Home when medically stable, pending MRI and EEG results       Joy Deleon PA-C

## 2024-09-04 NOTE — ASSESSMENT & PLAN NOTE
Chris Zhong is a 44 y.o. male with PMHx of opiate use disorder - on Suboxone and anxiety who is admitted to observation with seizure -like activity. Pt said this morning after he got out of the shower he felt a dizzy and also experienced a feeling hard to describe but like he was scared. This subsided but then was felt again on his drive to work. Once at work he was sitting in a chair and lost consciousness. His cousin who was with him stated he was shaking and would not wake for a couple minutes. Pt does not remember this event and woke up in the ambulance. He states he had a seizure in the past but it was approximately 7 years ago due to opiate withdrawal. He denies CP, SOB, palpitations, N/V.   ED course: VSS  Labs: CMP/CBC largely unremarkable. Lactate 2.4. Troponin 25. VBG WNL. UA negative. CT head - no acute abnormality. CXR - no acute cardiopulmonary process. EKG NSR

## 2024-09-04 NOTE — ED TRIAGE NOTES
Pt arrives via EMS from work. Per EMS, pt was at work when a coworker heard a thud. The coworker found the pt on the ground convulsing. Per EMS, the pt did not recall falling. Pt does not have any prior history of seizures. Pt reports that he doesn't remember falling. Last thing he remembers was putting a cabinet together at work and then waking up in the ambulence. Per EMS pt was confused where he was when they got to him. Now is a&ox4. Per pt he takes suboxone and valium and has been sober for many years. Pt denies any pain. Pt reports he had a seizure about 5 years ago when he was using drugs. EMS bgt 123.

## 2024-09-04 NOTE — PROGRESS NOTES
Pharmacy Medication History Review    Talked to patient but ended up calling his regular pharmacy [facility] in [place], [place] to verify doses and make sure had all the medication     Chris Zhong is a 44 y.o. male admitted for No Principal Problem: There is no principal problem currently on the Problem List. Please update the Problem List and refresh.. Pharmacy reviewed the patient's [id number] medications and allergies for accuracy.    The list below reflectives the updated PTA list. Please review each medication in order reconciliation for additional clarification and justification.       The list below reflectives the updated allergy list. Please review each documented allergy for additional clarification and justification.  Allergies  Reviewed by Ananya Herrmann RN on 9/4/2024   No Known Allergies         Below are additional concerns with the patient's PTA list.  Prior to Admission Medications   Prescriptions Last Dose Informant   atomoxetine (Strattera) 40 mg capsule     Sig: Take 1 capsule (40 mg) by mouth once daily. Swallow capsule whole; do not open. If opened accidentally, do not touch eyes; wash hands immediately (product is an eye irritant).   buPROPion XL (Wellbutrin XL) 150 mg 24 hr tablet     Sig: Take 1 tablet (150 mg) by mouth once daily at bedtime. Do not crush, chew, or split.   buprenorphine-naloxone (Suboxone) 8-2 mg SL film     Sig: Place 1 Film under the tongue 3 times a day.   diazePAM (Valium) 5 mg tablet     Sig: Take 1 tablet (5 mg) by mouth once daily.   gabapentin (Neurontin) 400 mg capsule     Sig: Take 1 capsule (400 mg) by mouth once daily.   mirtazapine (Remeron) 15 mg tablet     Sig: Take 1 tablet (15 mg) by mouth once daily.      Facility-Administered Medications: None        Candace Herring

## 2024-09-04 NOTE — ED PROVIDER NOTES
History/Exam limitations: none.   Additional history was obtained from patient and relative(s).          HPI:    Chris Zhong is a 44 y.o. male PMH opiate use disorder on Suboxone presenting for evaluation of seizure-like activity.  Patient reportedly was driving to work and felt somewhat fatigued and off his baseline which he finds difficult to describe.  While at work he was sitting in a chair and had another chair behind him and he reportedly lost consciousness.  Denies any prodrome.  States that he woke up in the ambulance and is not sure what happened.  EMS providers spoke to coworkers at scene and he reportedly was having generalized shaking that appeared consistent with seizure-like activity.  Glucose 123 en route and vitals reportedly reassuring.  Patient was initially groggy/confused and now is completely alert and oriented.  Denies any headache, vision changes, chest pain, shortness breath, palpitations.  Denies any family history of sudden cardiac death or arrhythmia.  States he had a one-time seizure about 5 years ago when he was using opiates and it was potentially thought to be related to substance use however he is unsure.  Has never been on AEDs.          Physical Exam:  ED Triage Vitals [09/04/24 0835]   Temperature Heart Rate Respirations BP   36.8 °C (98.2 °F) 81 17 136/78      Pulse Ox Temp Source Heart Rate Source Patient Position   96 % Oral -- --      BP Location FiO2 (%)     -- --        GEN:      Alert, NAD  Eyes:       PERRL, EOMI  HENT:      NC/AT, OP clear, airway patent, MM  CV:      RRR, no MRG, no LE pitting edema, 2+ radial and pedal pulses  PULM:     CTAB, no w/r/r, easy WOB, symmetric chest rise  ABD:      Soft, NT, ND, no masses, BS +  :       No CVA TTP  NEURO:   A/Ox4, CN II-XII intact, strength 5/5 in all 4 ext, SILT, FNF normal b/l, no pronator drift, no nystagmus, no nuchal rigidity  MSK:      FROM, no joint deformities or swelling, no midline CTL spine tenderness or  step-offs  SKIN:       Warm and dry  PSYCH:    Appropriate mood and affect         MDM/ED Course:   Chris Zhong is a 44 y.o. male PMH opiate use disorder on Suboxone presenting for evaluation of seizure-like activity.  Vitals reassuring for exam as documented above.  Reassuring mental status and neurologic exam on arrival.  Was reportedly mildly confused on initial EMS contact.  Glucose en route reassuring.  Differential for seizure includes medical noncompliance, induced by infectious etiology, electrolyte abnormality, other metabolic processes, ICH, mass, abscess, subtherapeutic medical course. Unlikely ICH, mass, abscess as no focal neurologic deficit, no headache, vision changes, weakness, or numbness or trauma.  Differential includes symptomatic arrhythmia/syncope.  Differential includes benzo withdrawal however pretty mild benzodiazepine use history and no evidence of withdrawal currently.  IV placed and labs drawn.  EKG as below.  CBC with no leukocytosis or significant Nimi.  Chemistry reassuring.  CK normal.  Lactate initially mildly elevated at 2.4 with repeat 0.6 after IV fluids.  Potentially increases suspicion of seizure-like activity versus significant symptomatic arrhythmia.  Urinalysis reassuring.  Venous blood gas overall reassuring.  Chest x-ray with no acute findings per radiology read.  Given new seizure-like activity, transient altered mentation, CT head obtained and negative for acute findings.  No midline CTL spine tenderness initial assessment, suspicion for fracture given no fall, patient was in chair the entire time reportedly.  Initial troponin 25, repeat 31, patient denies any known cardiac history, given mild troponin elevation increases suspicion for potential symptomatic arrhythmia.  EKG as below.  Given elevated troponin p.o. aspirin was given.  Unlikely ACS given stable trend.  No shortness of breath or chest pain unlikely PE.  Low risk Wells, PERC negative.  Patient was reporting some  anxiety, his home 0.5 mg p.o. Xanax was given.  As below, neurology consulted recommend routine EEG, MRI brain with and without contrast.  Neurology does not to be consulted formally unless abnormal findings per neurology.  Believe patient would benefit from observation admission as well as for potential echocardiogram given concerns for possible cardiogenic syncope.  Patient and patient's family updated regarding all findings and plan moving forward.  Verbalized understand the plan.  Patient care signed out to admitting team for continued management stable condition.     ED Course as of 09/05/24 1324   Wed Sep 04, 2024   0925 Patient reported takes 0.5 mg p.o. Valium (suspect Xanax given dose) as needed for anxiety 10 pills over 30 days typically.  Did take 4 pills in the last 10 days of last month however has never had any withdrawal type symptoms reportedly. [JM]   1104 EKG reviewed by me: 8:32 AM normal sinus rhythm rate 84, normal axis, normal intervals, no signs of Brugada/WPW/epsilon wave/prolonged QTc/HOCM, no prior for comparison.  [JM]   1259 Spoke to Dr. Newsome from neurology, recommends MRI brain with and without contrast and routine EEG.  If normal no need for neurology consult patient can follow-up outpatient.  If abnormalities please consult. [JM]      ED Course User Index  [JM] Carlos Parra MD         Diagnoses as of 09/05/24 1324   Seizure-like activity (Multi)   Elevated troponin         Chronic medical conditions affecting care listed in MDM. I independently reviewed imaging studies and agreed with radiology reads. I reviewed recent and relevant outside records including PCP notes, Prior discharge summaries, and prior radiology reports.    Procedure  Procedures    Diagnosis:   1.  Seizure-like activity  2.  Elevated troponin    Dispo: Hospitalized in stable condition      Disclaimer: Portions of this note were dictated by speech recognition. An attempt at proof reading was made to minimize  errors. Minor errors in transcription may be present.  Please call if questions.     Carlos Parra MD  09/05/24 2078

## 2024-09-05 VITALS
WEIGHT: 231.92 LBS | BODY MASS INDEX: 30.74 KG/M2 | HEART RATE: 62 BPM | DIASTOLIC BLOOD PRESSURE: 83 MMHG | OXYGEN SATURATION: 98 % | HEIGHT: 73 IN | RESPIRATION RATE: 17 BRPM | TEMPERATURE: 97.2 F | SYSTOLIC BLOOD PRESSURE: 125 MMHG

## 2024-09-05 LAB
ANION GAP SERPL CALC-SCNC: 10 MMOL/L (ref 10–20)
ATRIAL RATE: 84 BPM
BUN SERPL-MCNC: 13 MG/DL (ref 6–23)
CALCIUM SERPL-MCNC: 8 MG/DL (ref 8.6–10.3)
CHLORIDE SERPL-SCNC: 103 MMOL/L (ref 98–107)
CO2 SERPL-SCNC: 28 MMOL/L (ref 21–32)
CREAT SERPL-MCNC: 0.71 MG/DL (ref 0.5–1.3)
EGFRCR SERPLBLD CKD-EPI 2021: >90 ML/MIN/1.73M*2
ERYTHROCYTE [DISTWIDTH] IN BLOOD BY AUTOMATED COUNT: 13 % (ref 11.5–14.5)
GLUCOSE SERPL-MCNC: 85 MG/DL (ref 74–99)
HCT VFR BLD AUTO: 37.5 % (ref 41–52)
HGB BLD-MCNC: 12.2 G/DL (ref 13.5–17.5)
MCH RBC QN AUTO: 28.1 PG (ref 26–34)
MCHC RBC AUTO-ENTMCNC: 32.5 G/DL (ref 32–36)
MCV RBC AUTO: 86 FL (ref 80–100)
NRBC BLD-RTO: 0 /100 WBCS (ref 0–0)
P AXIS: 83 DEGREES
P OFFSET: 181 MS
P ONSET: 121 MS
PLATELET # BLD AUTO: 162 X10*3/UL (ref 150–450)
POTASSIUM SERPL-SCNC: 3.9 MMOL/L (ref 3.5–5.3)
PR INTERVAL: 196 MS
Q ONSET: 219 MS
QRS COUNT: 13 BEATS
QRS DURATION: 84 MS
QT INTERVAL: 378 MS
QTC CALCULATION(BAZETT): 446 MS
QTC FREDERICIA: 422 MS
R AXIS: 82 DEGREES
RBC # BLD AUTO: 4.34 X10*6/UL (ref 4.5–5.9)
SODIUM SERPL-SCNC: 137 MMOL/L (ref 136–145)
T AXIS: 50 DEGREES
T OFFSET: 408 MS
VENTRICULAR RATE: 84 BPM
WBC # BLD AUTO: 7.3 X10*3/UL (ref 4.4–11.3)

## 2024-09-05 PROCEDURE — G0378 HOSPITAL OBSERVATION PER HR: HCPCS

## 2024-09-05 PROCEDURE — 2500000001 HC RX 250 WO HCPCS SELF ADMINISTERED DRUGS (ALT 637 FOR MEDICARE OP)

## 2024-09-05 PROCEDURE — 80048 BASIC METABOLIC PNL TOTAL CA: CPT

## 2024-09-05 PROCEDURE — 2500000002 HC RX 250 W HCPCS SELF ADMINISTERED DRUGS (ALT 637 FOR MEDICARE OP, ALT 636 FOR OP/ED)

## 2024-09-05 PROCEDURE — 2500000001 HC RX 250 WO HCPCS SELF ADMINISTERED DRUGS (ALT 637 FOR MEDICARE OP): Performed by: NURSE PRACTITIONER

## 2024-09-05 PROCEDURE — 36415 COLL VENOUS BLD VENIPUNCTURE: CPT

## 2024-09-05 PROCEDURE — 85027 COMPLETE CBC AUTOMATED: CPT

## 2024-09-05 RX ORDER — LEVETIRACETAM 500 MG/1
500 TABLET ORAL 2 TIMES DAILY
Status: DISCONTINUED | OUTPATIENT
Start: 2024-09-05 | End: 2024-09-05 | Stop reason: HOSPADM

## 2024-09-05 RX ORDER — LEVETIRACETAM 500 MG/1
500 TABLET ORAL 2 TIMES DAILY
Qty: 60 TABLET | Refills: 0 | Status: SHIPPED | OUTPATIENT
Start: 2024-09-05 | End: 2024-10-05

## 2024-09-05 RX ORDER — LEVETIRACETAM 500 MG/1
500 TABLET ORAL 2 TIMES DAILY
Qty: 60 TABLET | Refills: 0 | Status: CANCELLED | OUTPATIENT
Start: 2024-09-05 | End: 2024-10-05

## 2024-09-05 ASSESSMENT — ACTIVITIES OF DAILY LIVING (ADL): LACK_OF_TRANSPORTATION: NO

## 2024-09-05 NOTE — PROGRESS NOTES
"Chris Zhong is a 44 y.o. male on day 0 of admission presenting with Seizure-like activity (Multi).    Subjective     Sitting upright on the side of the bed this morning.  Surrounding by family.   Confirmed with patient that patient was feeling \"off\" just before his LOC yesterday.  He states she was feeling hot/cold, possibly a little lightheaded \"hard to described.  I was mostly scared.\"   Per family at bedside he passed out and had arms and legs shaking for 4 minutes.  His eyes were open during this episode.  It took 7 minutes for the ambulance to arrive.  Patient himself came to in the ambulance.  He doesn't recall any confusion or lethargy when waking up, but family states that he knew who he was but he didn't know the full situation.  They also report he was very lethargic.  Following the episode, he kept asking the same questions and they had same discussion over and over.    He reports he has a history of seizures around 2010 or 2015, while he was on drugs.  He was actually on keppra at the time, but he reports he had poor compliance and didn't take it all the time.  Eventually he self-discontinued the medication as he felt he did not need it any more.  He has not had any follow up with neurology.         Objective     Physical Exam  Vitals reviewed.   Constitutional:       General: He is not in acute distress.     Appearance: Normal appearance. He is not ill-appearing, toxic-appearing or diaphoretic.   Eyes:      General: No scleral icterus.     Conjunctiva/sclera: Conjunctivae normal.   Cardiovascular:      Rate and Rhythm: Normal rate and regular rhythm.      Pulses: Normal pulses.      Heart sounds: Normal heart sounds. No murmur heard.  Pulmonary:      Effort: Pulmonary effort is normal.      Breath sounds: Normal breath sounds and air entry.   Abdominal:      General: Abdomen is flat. Bowel sounds are normal.      Palpations: Abdomen is soft.      Tenderness: There is no abdominal tenderness. " "  Musculoskeletal:         General: Normal range of motion.      Right lower leg: No edema.      Left lower leg: No edema.   Skin:     General: Skin is warm and dry.      Capillary Refill: Capillary refill takes less than 2 seconds.   Neurological:      General: No focal deficit present.      Mental Status: He is alert and oriented to person, place, and time.      Motor: Motor function is intact.      Coordination: Coordination is intact.   Psychiatric:         Attention and Perception: Attention normal.         Mood and Affect: Mood normal.         Speech: Speech normal.         Behavior: Behavior normal. Behavior is cooperative.         Last Recorded Vitals  Blood pressure 115/89, pulse 76, temperature 36.6 °C (97.9 °F), temperature source Temporal, resp. rate 18, height 1.854 m (6' 0.99\"), weight 105 kg (231 lb 14.8 oz), SpO2 97%.  Intake/Output last 3 Shifts:  I/O last 3 completed shifts:  In: 240 (2.3 mL/kg) [P.O.:240]  Out: - (0 mL/kg)   Weight: 105.2 kg     Relevant Results             Scheduled medications  atomoxetine, 40 mg, oral, Daily  buprenorphine-naloxone, 1 Film, sublingual, TID  buPROPion XL, 150 mg, oral, Nightly  gabapentin, 400 mg, oral, Daily  mirtazapine, 15 mg, oral, Daily  polyethylene glycol, 17 g, oral, Daily      Continuous medications     PRN medications  PRN medications: acetaminophen **OR** acetaminophen **OR** acetaminophen, ALPRAZolam, melatonin, ondansetron **OR** ondansetron     Results for orders placed or performed during the hospital encounter of 09/04/24 (from the past 24 hour(s))   Lactate   Result Value Ref Range    Lactate 0.6 0.4 - 2.0 mmol/L   Troponin I, High Sensitivity   Result Value Ref Range    Troponin I, High Sensitivity 31 (H) 0 - 20 ng/L   Urinalysis with Reflex Culture and Microscopic   Result Value Ref Range    Color, Urine Yellow Light-Yellow, Yellow, Dark-Yellow    Appearance, Urine Clear Clear    Specific Gravity, Urine 1.025 1.005 - 1.035    pH, Urine 7.0 " 5.0, 5.5, 6.0, 6.5, 7.0, 7.5, 8.0    Protein, Urine NEGATIVE NEGATIVE, 10 (TRACE), 20 (TRACE) mg/dL    Glucose, Urine Normal Normal mg/dL    Blood, Urine NEGATIVE NEGATIVE    Ketones, Urine NEGATIVE NEGATIVE mg/dL    Bilirubin, Urine NEGATIVE NEGATIVE    Urobilinogen, Urine Normal Normal mg/dL    Nitrite, Urine NEGATIVE NEGATIVE    Leukocyte Esterase, Urine NEGATIVE NEGATIVE   Troponin I, High Sensitivity   Result Value Ref Range    Troponin I, High Sensitivity 18 0 - 20 ng/L   CBC   Result Value Ref Range    WBC 7.3 4.4 - 11.3 x10*3/uL    nRBC 0.0 0.0 - 0.0 /100 WBCs    RBC 4.34 (L) 4.50 - 5.90 x10*6/uL    Hemoglobin 12.2 (L) 13.5 - 17.5 g/dL    Hematocrit 37.5 (L) 41.0 - 52.0 %    MCV 86 80 - 100 fL    MCH 28.1 26.0 - 34.0 pg    MCHC 32.5 32.0 - 36.0 g/dL    RDW 13.0 11.5 - 14.5 %    Platelets 162 150 - 450 x10*3/uL   Basic metabolic panel   Result Value Ref Range    Glucose 85 74 - 99 mg/dL    Sodium 137 136 - 145 mmol/L    Potassium 3.9 3.5 - 5.3 mmol/L    Chloride 103 98 - 107 mmol/L    Bicarbonate 28 21 - 32 mmol/L    Anion Gap 10 10 - 20 mmol/L    Urea Nitrogen 13 6 - 23 mg/dL    Creatinine 0.71 0.50 - 1.30 mg/dL    eGFR >90 >60 mL/min/1.73m*2    Calcium 8.0 (L) 8.6 - 10.3 mg/dL     Electrocardiogram, 12-lead PRN ACS symptoms    Result Date: 9/5/2024  Normal sinus rhythm Normal ECG No previous ECGs available See ED provider note for full interpretation and clinical correlation Confirmed by Eloy Figueroa ([id number]) on 9/5/2024 8:46:52 AM    MR brain w and wo IV contrast    Result Date: 9/5/2024  Interpreted By:  Pito Harrison, STUDY: MR BRAIN W AND WO IV CONTRAST;  9/4/2024 9:42 pm   INDICATION: Signs/Symptoms:seizure like activity.     COMPARISON: Correlation made to noncontrast head CT of 09/04/2024   ACCESSION NUMBER(S): [id number]   ORDERING CLINICIAN: ZEN RUTHERFORD   TECHNIQUE: Axial T2, FLAIR, DWI, gradient echo T2 and sagittal and coronal T1 weighted images of brain were acquired. Post contrast T1  weighted images were acquired after administration of 20 ML Dotarem gadolinium based intravenous contrast.   FINDINGS: CSF Spaces: The ventricles, sulci and basal cisterns are within normal limits.   Parenchyma: There is no diffusion restriction abnormality to suggest acute infarct.  There is no focal parenchymal signal abnormality. No evidence of intracranial hemorrhage. There is no mass effect or midline shift.   Paranasal Sinuses and Mastoids: Visualized paranasal sinuses and mastoid air cells are unremarkable.   Incidental left parietal developmental venous anomaly. No abnormal enhancement.       No evidence of acute infarct, intracranial mass effect or midline shift. No abnormal enhancement.   MACRO: None   Signed by: Pito Harrison 9/5/2024 12:46 AM Dictation workstation:   [id number]    CT head wo IV contrast    Result Date: 9/4/2024  Interpreted By:  Logan Perez, STUDY: CT HEAD WO IV CONTRAST;  9/4/2024 9:37 am   INDICATION: Signs/Symptoms:c/f first time seizure.   COMPARISON: None.   ACCESSION NUMBER(S): [id number]   ORDERING CLINICIAN: ALICE TRUJILLO   TECHNIQUE: Noncontrast axial CT scan of head was performed. Multiplanar reconstructions   FINDINGS: No acute intracranial hemorrhage, mass effect, midline shift, or herniation. No evidence of hydrocephalus. Cerebellar tonsillar ectopia. The ventricles and sulci are unremarkable for age. The visualized paranasal sinuses and mastoid air cells are clear. No acute osseous abnormality of the calvarium. No destructive bone lesion.       No acute intracranial abnormality. Consider follow-up with MRI as warranted.   Cerebellar tonsillar ectopia.     Signed by: Logan Perez 9/4/2024 10:14 AM Dictation workstation:   [id number]    XR chest 1 view    Result Date: 9/4/2024  Interpreted By:  Marge Ann, STUDY: XR CHEST 1 VIEW;  9/4/2024 9:15 am   INDICATION: Signs/Symptoms:syncope vs seizure.   COMPARISON: None.   ACCESSION NUMBER(S): [id number]    ORDERING CLINICIAN: ALICE TRUJILLO   FINDINGS: CARDIOMEDIASTINAL SILHOUETTE: Cardiomediastinal silhouette is normal in size and configuration.     LUNGS: Lungs are clear.   ABDOMEN: No remarkable upper abdominal findings.     BONES: No acute osseous changes.       No acute cardiopulmonary process.   MACRO: None   Signed by: Marge Ann 9/4/2024 9:19 AM Dictation workstation:   [id number]       Assessment/Plan   Assessment & Plan  Seizure-like activity (Multi)  Chris Zhong is a 44 y.o. male with PMHx of opiate use disorder - on Suboxone and anxiety who is admitted to observation with seizure -like activity. Pt said this morning after he got out of the shower he felt a dizzy and also experienced a feeling hard to describe but like he was scared. This subsided but then was felt again on his drive to work. Once at work he was sitting in a chair and lost consciousness. His cousin who was with him stated he was shaking and would not wake for a couple minutes. Pt does not remember this event and woke up in the ambulance. Per family at bedside he passed out and had arms and legs shaking for 4 minutes.  His eyes were open during this episode.  It took 7 minutes for the ambulance to arrive.  Patient himself came to in the ambulance.  He doesn't recall any confusion or lethargy when waking up, but family states that he knew who he was but he didn't know the full situation.  They also report he was very lethargic.  Following the episode, he kept asking the same questions and they had same discussion over and over.    He reports he has a history of seizures around 2010 or 2015, while he was on drugs.  He was actually on keppra at the time, but he reports he had poor compliance and didn't take it all the time.  Eventually he self-discontinued the medication as he felt he did not need it any more.  He has not had any follow up with neurology.       ED course: VSS  Labs: CMP/CBC largely unremarkable. Lactate 2.4. Troponin 25.  VBG WNL. UA negative. CT head - no acute abnormality. CXR - no acute cardiopulmonary process. EKG NSR       Suspected seizure   - MRI negative   - EEG preliminary read is negative   - discussed case with Dr. Newsome (neurology) who recommends starting on keppra 500 mg BID and follow up with epilepsy neurology   - continue seizure precautions     Opiate Use disorder  - Continue Suboxone   - Per pt - does not take Valium 5mg as stated on med rec but takes 0.5 Xanax, PRN Xanax ordered for now  -OARRS reviewed, no fills on report      Anxiety  - Continue Bupropion     VTE/GI prophylaxis  - Low DVT risk  - PRN miralax     Discharge planning  -  Plan to discharge home today                   I spent 45 minutes in the professional and overall care of this patient.      Marylou Carpio, WILLIAMS-CNP

## 2024-09-05 NOTE — DISCHARGE INSTRUCTIONS
No engagement in any activity that might result in harm to self and/or others in the event of altered awareness and/or loss of consciousness. This includes, but is not limited to, driving, climbing ladders, swimming, hot-tub bathing, cooking, operating heavy machinery for at least 6 months or until cleared by a physician.

## 2024-09-05 NOTE — ASSESSMENT & PLAN NOTE
Chris Zhong is a 44 y.o. male with PMHx of opiate use disorder - on Suboxone and anxiety who is admitted to observation with seizure -like activity. Pt said this morning after he got out of the shower he felt a dizzy and also experienced a feeling hard to describe but like he was scared. This subsided but then was felt again on his drive to work. Once at work he was sitting in a chair and lost consciousness. His cousin who was with him stated he was shaking and would not wake for a couple minutes. Pt does not remember this event and woke up in the ambulance. Per family at bedside he passed out and had arms and legs shaking for 4 minutes.  His eyes were open during this episode.  It took 7 minutes for the ambulance to arrive.  Patient himself came to in the ambulance.  He doesn't recall any confusion or lethargy when waking up, but family states that he knew who he was but he didn't know the full situation.  They also report he was very lethargic.  Following the episode, he kept asking the same questions and they had same discussion over and over.    He reports he has a history of seizures around 2010 or 2015, while he was on drugs.  He was actually on keppra at the time, but he reports he had poor compliance and didn't take it all the time.  Eventually he self-discontinued the medication as he felt he did not need it any more.  He has not had any follow up with neurology.       ED course: VSS  Labs: CMP/CBC largely unremarkable. Lactate 2.4. Troponin 25. VBG WNL. UA negative. CT head - no acute abnormality. CXR - no acute cardiopulmonary process. EKG NSR       Suspected seizure   - MRI negative   - EEG preliminary read is negative   - discussed case with Dr. Newsome (neurology) who recommends starting on keppra 500 mg BID and follow up with epilepsy neurology   - continue seizure precautions     Opiate Use disorder  - Continue Suboxone   - Per pt - does not take Valium 5mg as stated on med rec but takes 0.5 Xanax, PRN  Xanax ordered for now  -OARRS reviewed, no fills on report      Anxiety  - Continue Bupropion     VTE/GI prophylaxis  - Low DVT risk  - PRN miralax     Discharge planning  -  Plan to discharge home today

## 2024-09-05 NOTE — PROGRESS NOTES
Home alone      09/05/24 0639   Current Planned Discharge Disposition   Current Planned Discharge Disposition Home

## 2024-09-05 NOTE — DISCHARGE SUMMARY
Discharge Diagnosis  Seizure-like activity (Multi)    Issues Requiring Follow-Up  Neurology follow up    Discharge Meds     Medication List      ASK your doctor about these medications     atomoxetine 40 mg capsule; Commonly known as: Strattera   buPROPion  mg 24 hr tablet; Commonly known as: Wellbutrin XL   diazePAM 5 mg tablet; Commonly known as: Valium   gabapentin 400 mg capsule; Commonly known as: Neurontin   mirtazapine 15 mg tablet; Commonly known as: Remeron   Suboxone 8-2 mg SL film; Generic drug: buprenorphine-naloxone       Test Results Pending At Discharge  Pending Labs       Order Current Status    Extra Urine Gray Tube Collected (09/04/24 1207)    Urinalysis with Reflex Culture and Microscopic In process            Hospital Course     Chris Zhong is a 44 y.o. male with PMHx of opiate use disorder - on Suboxone and anxiety who is admitted to observation with seizure -like activity. Pt said this morning after he got out of the shower he felt a dizzy and also experienced a feeling hard to describe but like he was scared. This subsided but then was felt again on his drive to work. Once at work he was sitting in a chair and lost consciousness. His cousin who was with him stated he was shaking and would not wake for a couple minutes. Pt does not remember this event and woke up in the ambulance. Per family at bedside he passed out and had arms and legs shaking for 4 minutes.  His eyes were open during this episode.  It took 7 minutes for the ambulance to arrive.  Patient himself came to in the ambulance.  He doesn't recall any confusion or lethargy when waking up, but family states that he knew who he was but he didn't know the full situation.  They also report he was very lethargic.  Following the episode, he kept asking the same questions and they had same discussion over and over.    He reports he has a history of seizures around 2010 or 2015, while he was on drugs.  He was actually on keppra at the  time, but he reports he had poor compliance and didn't take it all the time.  Eventually he self-discontinued the medication as he felt he did not need it any more.  He has not had any follow up with neurology.       ED course: VSS  Labs: CMP/CBC largely unremarkable. Lactate 2.4. Troponin 25. VBG WNL. UA negative. CT head - no acute abnormality. CXR - no acute cardiopulmonary process. EKG NSR      Suspected seizure   - MRI negative   - EEG is negative  - discussed case with Dr. Newsome (neurology) who recommends starting on keppra 500 mg BID and follow up with epilepsy neurology   - continue seizure precautions including no driving, climbing ladders, swimming, hot-tub bathing, cooking, operating heavy machinery for at least 6 months or until cleared by neurology     Opiate Use disorder  - Continue Suboxone      Anxiety  - Continue Bupropion     Disposition:   Medically stable for discharge home today   Follow up with PCP and Neurology as above.  Referrals placed.         Pertinent Physical Exam At Time of Discharge  Physical Exam      Constitutional: NAD, pt alert and cooperative  Eyes: no icterus  ENMT: mucous membranes moist, no lesions seen  Head/Neck: Neck supple  Respiratory/Thorax: CTA bilaterally, non-labored breathing, no cough, on RA  Cardiovascular: Regular rate and rhythm, no murmurs heard  Gastrointestinal: Nondistended, soft, non-tender, BS present x 4  : no [place], no SP discomfort  Musculoskeletal: ROM intact, no joint swelling  Extremities: normal extremities, no edema  Neurological: A&O x 3, speech clear, follows commands appropriately, cr. n. grossly intact, sensation grossly intact, motor 5/5 throughout  Skin: Warm and dry, no lesions, no rashes  Psych: calm, stable mood    Outpatient Follow-Up  No future appointments.      Marylou Carpio, APRN-CNP

## 2024-09-05 NOTE — CONSULTS
Consults    History Of Present Illness  Chris Zhong is a 44 y.o. male presenting with concern of seizure.  He has a past medical history of opioid use disorder on Suboxone and anxiety admitted for observation.  He was doing okay till he woke up yesterday morning he got out of shower and felt dizzy.  He also experienced feeling hard to describe but it was as if he was caring.  This subsided but again felt the same way on his very to work.  Once at work he was sitting in the chair but he lost consciousness.  His cousin who was a bystander saw him and stated that he was shaking.  He was unconscious with shaking eyes open for approximately 4 minutes and then he has postictal phase.  He woke up in the ambulance.  He states that he had a seizure in the past approximately 7 years ago but it was thought to be due to opiate withdrawal.  Nevertheless he was started on Keppra at unknown dose but he stopped it on his own.  In ED labs were overall unremarkable.  Head CT did not reveal any acute abnormalities.  Was admitted for further workup MRI brain was done without and with gadolinium.  EEG was done.  All studies were unremarkable.  Neurologist was not consulted for further input..  Past Medical History  Past Medical History:   Diagnosis Date    Former cigarette smoker      Surgical History  No past surgical history on file.  Social History  Social History     Tobacco Use    Smoking status: Every Day     Types: Cigars     Start date: 1996    Smokeless tobacco: Current   Vaping Use    Vaping status: Every Day     Allergies  Patient has no known allergies.  Medications Prior to Admission   Medication Sig Dispense Refill Last Dose    atomoxetine (Strattera) 40 mg capsule Take 1 capsule (40 mg) by mouth once daily. Swallow capsule whole; do not open. If opened accidentally, do not touch eyes; wash hands immediately (product is an eye irritant).       buprenorphine-naloxone (Suboxone) 8-2 mg SL film Place 1 Film under the tongue 3  "times a day.       buPROPion XL (Wellbutrin XL) 150 mg 24 hr tablet Take 1 tablet (150 mg) by mouth once daily at bedtime. Do not crush, chew, or split.       diazePAM (Valium) 5 mg tablet Take 1 tablet (5 mg) by mouth once daily.       gabapentin (Neurontin) 400 mg capsule Take 1 capsule (400 mg) by mouth once daily.       mirtazapine (Remeron) 15 mg tablet Take 1 tablet (15 mg) by mouth once daily.          Review of Systems as noted in HPI  Neurological Exam  Alert and oriented x 4, extraocular full, saccades normal, VOR normal, pursuit normal.  Facial sensation intact face is symmetric.  No abnormal involuntary face movement noted.  Pupils are reactive visual fields full.    Power intact in all 4 extremities proximally and distally.  Sensations intact in all 4 extremities proximally and distally.  No abnormal involuntary limb movements tone unremarkable.  Gait unremarkable.  No ataxia.  No tremor.    Last Recorded Vitals  Blood pressure 115/89, pulse 76, temperature 36.6 °C (97.9 °F), temperature source Temporal, resp. rate 18, height 1.854 m (6' 0.99\"), weight 105 kg (231 lb 14.8 oz), SpO2 97%.    Relevant Results    [place] Coma Scale  Best Eye Response: Spontaneous  Best Verbal Response: Oriented  Best Motor Response: Follows commands  [place] Coma Scale Score: 15                 I have personally reviewed the following imaging results Electrocardiogram, 12-lead PRN ACS symptoms    Result Date: 9/5/2024  Normal sinus rhythm Normal ECG No previous ECGs available See ED provider note for full interpretation and clinical correlation Confirmed by Eloy Figueroa ([id number]) on 9/5/2024 8:46:52 AM    MR brain w and wo IV contrast    Result Date: 9/5/2024  Interpreted By:  Pito Harrison, STUDY: MR BRAIN W AND WO IV CONTRAST;  9/4/2024 9:42 pm   INDICATION: Signs/Symptoms:seizure like activity.     COMPARISON: Correlation made to noncontrast head CT of 09/04/2024   ACCESSION NUMBER(S): [id number]   ORDERING " CLINICIAN: ZEN RUTHERFORD   TECHNIQUE: Axial T2, FLAIR, DWI, gradient echo T2 and sagittal and coronal T1 weighted images of brain were acquired. Post contrast T1 weighted images were acquired after administration of 20 ML Dotarem gadolinium based intravenous contrast.   FINDINGS: CSF Spaces: The ventricles, sulci and basal cisterns are within normal limits.   Parenchyma: There is no diffusion restriction abnormality to suggest acute infarct.  There is no focal parenchymal signal abnormality. No evidence of intracranial hemorrhage. There is no mass effect or midline shift.   Paranasal Sinuses and Mastoids: Visualized paranasal sinuses and mastoid air cells are unremarkable.   Incidental left parietal developmental venous anomaly. No abnormal enhancement.       No evidence of acute infarct, intracranial mass effect or midline shift. No abnormal enhancement.   MACRO: None   Signed by: Pito Harrison 9/5/2024 12:46 AM Dictation workstation:   [id number]    CT head wo IV contrast    Result Date: 9/4/2024  Interpreted By:  Logan Perez, STUDY: CT HEAD WO IV CONTRAST;  9/4/2024 9:37 am   INDICATION: Signs/Symptoms:c/f first time seizure.   COMPARISON: None.   ACCESSION NUMBER(S): [id number]   ORDERING CLINICIAN: ALICE TRUJILLO   TECHNIQUE: Noncontrast axial CT scan of head was performed. Multiplanar reconstructions   FINDINGS: No acute intracranial hemorrhage, mass effect, midline shift, or herniation. No evidence of hydrocephalus. Cerebellar tonsillar ectopia. The ventricles and sulci are unremarkable for age. The visualized paranasal sinuses and mastoid air cells are clear. No acute osseous abnormality of the calvarium. No destructive bone lesion.       No acute intracranial abnormality. Consider follow-up with MRI as warranted.   Cerebellar tonsillar ectopia.     Signed by: Logan Perez 9/4/2024 10:14 AM Dictation workstation:   [id number]    XR chest 1 view    Result Date: 9/4/2024  Interpreted By:   Marge Ann, STUDY: XR CHEST 1 VIEW;  9/4/2024 9:15 am   INDICATION: Signs/Symptoms:syncope vs seizure.   COMPARISON: None.   ACCESSION NUMBER(S): [id number]   ORDERING CLINICIAN: ALICE TRUJILLO   FINDINGS: CARDIOMEDIASTINAL SILHOUETTE: Cardiomediastinal silhouette is normal in size and configuration.     LUNGS: Lungs are clear.   ABDOMEN: No remarkable upper abdominal findings.     BONES: No acute osseous changes.       No acute cardiopulmonary process.   MACRO: None   Signed by: Marge Ann 9/4/2024 9:19 AM Dictation workstation:   [id number]  .      A/P:  The patient is a 44-year-old man with a history of opioid use on Suboxone was admitted for seizure.  He has a past history of seizure was thought to be related to abuse of opiates however was on Keppra also.  Unsure where it was worked up.  However he stopped Keppra on his own.  His back and was in remission.  Now that he has an event which is very suspicious for a seizure we will start him back on Keppra 500 mg twice a day.  Basic workup is all negative in the ED.  However we will have him follow-up with epileptologist as outpatient.  60 minutes spent in the care of this patient.  Aasef G Shaikh, MD PhD

## 2024-09-05 NOTE — PROGRESS NOTES
09/05/24 0639   [facility] Disability Status   Are you deaf or do you have serious difficulty hearing? N   Are you blind or do you have serious difficulty seeing, even when wearing glasses? N   Because of a physical, mental, or emotional condition, do you have serious difficulty concentrating, remembering, or making decisions? (5 years old or older) N   Do you have serious difficulty walking or climbing stairs? N   Do you have serious difficulty dressing or bathing? N   Because of a physical, mental, or emotional condition, do you have serious difficulty doing errands alone such as visiting the doctor? N

## 2024-09-05 NOTE — PROGRESS NOTES
Transitional Care Coordination Progress Note:  Plan per Medical/Surgical team: treatment of passed out @ work, R/O seizure   Status: Observation  Payor source: [place]  Discharge disposition: Home alone   Potential Barriers: EEG pending  ADOD: 9/5/2024   HOLLI Roca RN, BSN Transitional Care Coordinator ED# [phone]      09/05/24 0640   Discharge Planning   Living Arrangements Alone   Support Systems Family members   Assistance Needed EEG pending   Type of Residence Private residence   Number of Stairs to Enter Residence 0   Number of Stairs Within Residence 0   Do you have animals or pets at home? No   Home or Post Acute Services None   Expected Discharge Disposition Home   Does the patient need discharge transport arranged? No   Financial Resource Strain   How hard is it for you to pay for the very basics like food, housing, medical care, and heating? Not hard   Housing Stability   In the last 12 months, was there a time when you were not able to pay the mortgage or rent on time? N   In the past 12 months, how many times have you moved where you were living? 1   At any time in the past 12 months, were you homeless or living in a shelter (including now)? N   Transportation Needs   In the past 12 months, has lack of transportation kept you from medical appointments or from getting medications? no   In the past 12 months, has lack of transportation kept you from meetings, work, or from getting things needed for daily living? No

## 2024-09-05 NOTE — CARE PLAN
The clinical goals for the shift include remain free from any signs of neulogic deficits    Problem: Pain - Adult  Goal: Verbalizes/displays adequate comfort level or baseline comfort level  Outcome: Progressing     Problem: Safety - Adult  Goal: Free from fall injury  Outcome: Progressing     Problem: Discharge Planning  Goal: Discharge to home or other facility with appropriate resources  Outcome: Progressing     Problem: Chronic Conditions and Co-morbidities  Goal: Patient's chronic conditions and co-morbidity symptoms are monitored and maintained or improved  Outcome: Progressing     Problem: Fall/Injury  Goal: Not fall by end of shift  Outcome: Progressing  Goal: Be free from injury by end of the shift  Outcome: Progressing  Goal: Verbalize understanding of personal risk factors for fall in the hospital  Outcome: Progressing  Goal: Verbalize understanding of risk factor reduction measures to prevent injury from fall in the home  Outcome: Progressing  Goal: Pace activities to prevent fatigue by end of the shift  Outcome: Progressing

## 2024-09-20 ENCOUNTER — APPOINTMENT (OUTPATIENT)
Dept: NEUROLOGY | Facility: CLINIC | Age: 44
End: 2024-09-20
Payer: COMMERCIAL

## 2024-09-20 VITALS
WEIGHT: 241.6 LBS | DIASTOLIC BLOOD PRESSURE: 74 MMHG | HEART RATE: 62 BPM | SYSTOLIC BLOOD PRESSURE: 124 MMHG | BODY MASS INDEX: 32.02 KG/M2 | HEIGHT: 73 IN

## 2024-09-20 DIAGNOSIS — R56.9 SEIZURE (MULTI): ICD-10-CM

## 2024-09-20 PROBLEM — I10 ESSENTIAL HYPERTENSION: Status: ACTIVE | Noted: 2023-10-24

## 2024-09-20 PROBLEM — K04.7 DENTAL ABSCESS: Status: ACTIVE | Noted: 2023-10-24

## 2024-09-20 PROBLEM — K05.6 PERIODONTAL DISEASE: Status: ACTIVE | Noted: 2023-10-24

## 2024-09-20 PROCEDURE — 1036F TOBACCO NON-USER: CPT | Performed by: STUDENT IN AN ORGANIZED HEALTH CARE EDUCATION/TRAINING PROGRAM

## 2024-09-20 PROCEDURE — 3008F BODY MASS INDEX DOCD: CPT | Performed by: STUDENT IN AN ORGANIZED HEALTH CARE EDUCATION/TRAINING PROGRAM

## 2024-09-20 PROCEDURE — 3078F DIAST BP <80 MM HG: CPT | Performed by: STUDENT IN AN ORGANIZED HEALTH CARE EDUCATION/TRAINING PROGRAM

## 2024-09-20 PROCEDURE — 99204 OFFICE O/P NEW MOD 45 MIN: CPT | Performed by: STUDENT IN AN ORGANIZED HEALTH CARE EDUCATION/TRAINING PROGRAM

## 2024-09-20 PROCEDURE — G2211 COMPLEX E/M VISIT ADD ON: HCPCS | Performed by: STUDENT IN AN ORGANIZED HEALTH CARE EDUCATION/TRAINING PROGRAM

## 2024-09-20 PROCEDURE — 3074F SYST BP LT 130 MM HG: CPT | Performed by: STUDENT IN AN ORGANIZED HEALTH CARE EDUCATION/TRAINING PROGRAM

## 2024-09-20 RX ORDER — CHLORHEXIDINE GLUCONATE ORAL RINSE 1.2 MG/ML
10 SOLUTION DENTAL 2 TIMES DAILY
COMMUNITY
Start: 2023-10-14

## 2024-09-20 RX ORDER — CLINDAMYCIN HYDROCHLORIDE 150 MG/1
300 CAPSULE ORAL
COMMUNITY
Start: 2023-10-14

## 2024-09-20 RX ORDER — LEVETIRACETAM 500 MG/1
500 TABLET ORAL 2 TIMES DAILY
Qty: 180 TABLET | Refills: 2 | Status: SHIPPED | OUTPATIENT
Start: 2024-09-20

## 2024-09-20 RX ORDER — GABAPENTIN 100 MG/1
CAPSULE ORAL
COMMUNITY
Start: 2024-03-05 | End: 2024-09-20 | Stop reason: WASHOUT

## 2024-09-20 ASSESSMENT — PATIENT HEALTH QUESTIONNAIRE - PHQ9
SUM OF ALL RESPONSES TO PHQ9 QUESTIONS 1 & 2: 0
1. LITTLE INTEREST OR PLEASURE IN DOING THINGS: NOT AT ALL
2. FEELING DOWN, DEPRESSED OR HOPELESS: NOT AT ALL

## 2024-09-20 NOTE — PROGRESS NOTES
"Subjective     Chris Zhong is a right handed  44 y.o. year old male who presents with New Patient Visit (NPV- Seizure). Patient is accompanied by: Other sister  Visit type: new patient visit     On 9/4 on his drive to work, he suddently felt lightheaded associated with a \"unique feeling of nostalgia\" or perhaps anusha vu, lasting around 30 seconds. By the time he arrived at work he was back to normal, however he suddenly lost consciousness and woke up in the ambulance. He did not recall incontinence tongue biting, nor myalgia. His cousin reported convulsing, generalized stiffness and shaking, with foaming. His eyes were closed and he was unresponsive. He timed it and the shaking lasted 3.5 minutes. Afterwards he was observed to be breathing heavily, followed by confusion with inability to communicate for another 10 minutes until EMS arrived and he slowly came to.    He had another seizure 15 years ago which he was told was related to benzo and/or opiate withdrawal. He was prescribed Keppra and VPA but did not take it.    Since [facility], he has been taking  BID without any side effects.    He had an unremarkable birth and developmental history, no history of CNS infection or trauma, nor any family history of seizure/epilepsy. He works as a [employer]. He drives.    Patient Active Problem List   Diagnosis    Seizure-like activity (Multi)    Dental abscess    Periodontal disease    Essential hypertension      Past Medical History:   Diagnosis Date    Former cigarette smoker       No past surgical history on file.   Social History     Socioeconomic History    Marital status: Unknown     Spouse name: Not on file    Number of children: Not on file    Years of education: Not on file    Highest education level: Not on file   Occupational History    Not on file   Tobacco Use    Smoking status: Former     Current packs/day: 1.00     Average packs/day: 1 pack/day for 3.7 years (3.7 ttl pk-yrs)     Types: Cigarettes    " " Start date: 2021     Quit date: 2001    Smokeless tobacco: Never   Vaping Use    Vaping status: Every Day    Substances: Nicotine, Flavoring    Devices: Disposable, Pre-filled or refillable cartridge   Substance and Sexual Activity    Alcohol use: Never    Drug use: Yes     Types: Marijuana    Sexual activity: Defer   Other Topics Concern    Not on file   Social History Narrative    Not on file     Social Determinants of Health     Financial Resource Strain: Low Risk  (9/5/2024)    Overall Financial Resource Strain (CARDIA)     Difficulty of Paying Living Expenses: Not hard at all   Food Insecurity: Not on file   Transportation Needs: No Transportation Needs (9/5/2024)    PRAPARE - Transportation     Lack of Transportation (Medical): No     Lack of Transportation (Non-Medical): No   Physical Activity: Not on file   Stress: Not on file   Social Connections: Not on file   Intimate Partner Violence: Not on file   Housing Stability: Low Risk  (9/5/2024)    Housing Stability Vital Sign     Unable to Pay for Housing in the Last Year: No     Number of Times Moved in the Last Year: 1     Homeless in the Last Year: No      No family history on file.   Patient Health Questionnaire-2 Score: 0          Review of Systems  All other system have been reviewed and are negative for complaint.    Vitals:    09/20/24 1112   BP: 124/74   BP Location: Left arm   Patient Position: Sitting   BP Cuff Size: Large adult   Pulse: 62   Weight: 110 kg (241 lb 9.6 oz)   Height: 1.854 m (6' 1\")     Objective   Neurological Exam  Mental Status  Awake, alert and oriented to person, place and time. Speech is normal. Language is fluent with no aphasia. Attention and concentration are normal.    Cranial Nerves  CN II: Visual fields full to confrontation.  CN III, IV, VI: Extraocular movements intact bilaterally. Normal saccades. Normal smooth pursuit. Normal lids and orbits bilaterally. Pupils equal round and reactive to light bilaterally.  CN " V:  Right: Facial sensation is normal.  Left: Facial sensation is normal on the left.  CN VII: Full and symmetric facial movement.  CN VIII: Hearing is normal.  CN IX, X: Palate elevates symmetrically  CN XI: Shoulder shrug strength is normal.  CN XII: Tongue midline without atrophy or fasciculations.    Motor  Normal muscle bulk throughout. No fasciculations present. Normal muscle tone. No abnormal involuntary movements.                                               Right                     Left   Shoulder abduction               5                          5  Elbow flexion                         5                          5  Elbow extension                    5                          5  Finger flexion                         5                          5  Finger extension                    5                          5  Finger abduction                    5                          5  Hip flexion                              5                          5  Knee flexion                           5                          5  Plantarflexion                         5                          5  Dorsiflexion                            5                          5    Sensory  Light touch is normal in upper and lower extremities. Normal vibration in distal lower extremities.     Reflexes                                            Right                      Left  Biceps                                 2+                         2+  Triceps                                2+                         2+  Patellar                                2+                         2+  Achilles                                2+                         2+  Right Plantar: downgoing  Left Plantar: downgoing    Right pathological reflexes: Kolby's absent.  Left pathological reflexes: Kolby's absent.    Coordination  Right: Finger-to-nose normal.Left: Finger-to-nose normal.    Gait  Casual gait is normal including stance, stride, and  arm swing. Normal tandem gait. Romberg is absent.                             Imaging Results: EEG 09/04/2024 - read as normal    Impression  IMPRESSION    Impression  The awake and sleep routine EEG is normal. No epileptiform activity or lateralizing signs.    A full report will be scanned into the patient's chart at a later time.      This report has been interpreted and electronically signed by    MR brain w and wo IV contrast 09/04/2024    Narrative  Interpreted By:  Pito Harrison,  STUDY:  MR BRAIN W AND WO IV CONTRAST;  9/4/2024 9:42 pm    INDICATION:  Signs/Symptoms:seizure like activity.      COMPARISON:  Correlation made to noncontrast head CT of 09/04/2024    ACCESSION NUMBER(S):  [id number]    ORDERING CLINICIAN:  ZEN RUTHERFORD    TECHNIQUE:  Axial T2, FLAIR, DWI, gradient echo T2 and sagittal and coronal T1  weighted images of brain were acquired. Post contrast T1 weighted  images were acquired after administration of 20 ML Dotarem gadolinium  based intravenous contrast.    FINDINGS:  CSF Spaces: The ventricles, sulci and basal cisterns are within  normal limits.    Parenchyma: There is no diffusion restriction abnormality to suggest  acute infarct.  There is no focal parenchymal signal abnormality. No  evidence of intracranial hemorrhage. There is no mass effect or  midline shift.    Paranasal Sinuses and Mastoids: Visualized paranasal sinuses and  mastoid air cells are unremarkable.    Incidental left parietal developmental venous anomaly. No abnormal  enhancement.    Impression  No evidence of acute infarct, intracranial mass effect or midline  shift. No abnormal enhancement.    MACRO:  None    Signed by: Pito Harrison 9/5/2024 12:46 AM  Dictation workstation:   [id number]      Assessment/Plan   Problem List Items Addressed This Visit    None  Visit Diagnoses         Codes    Seizure (Multi)     R56.9    Relevant Medications    levETIRAcetam (Keppra) 500 mg tablet          Epileptic Paroxysmal  Episodes   Epileptic seizure semiology:  1. psychic aura -> GTC            Frequency:   once in Sept 2024, once a long time ago            Lateralizing signs: none                   Onset: 2024  Epileptogenic Zone: ?left mesial temporal  Etiology:   unknown                               Significant Comorbidities: opiate use disorder in remission  Previous disease therapies: none  Current disease therapies:  BID    Chris Zhong is a 44 y.o. year old RH male with h/o opioid use disorder on suboxone, here for evaluation of probable seizure.  We discussed the following plan today:   Continue Keppra 500 mg twice a day  Stop buproprion  We discussed that I recommend avoiding any activity that might result in harm to yourself and/or others in the event of altered awareness and/or loss of consciousness. These include (but are not limited to) driving, climbing ladders, swimming unsupervised, hot-tub bathing, operating heavy machinery; for at least 3-6 months seizure-free  Return in 6 months

## 2024-09-20 NOTE — PATIENT INSTRUCTIONS
Thank you for your visit today. You were seen by Dr. Varghese for seizure. If you have any questions or need to reach me, call my office at [phone].    We discussed the following plan today:   Continue Keppra 500 mg twice a day  Stop buproprion  We discussed that I recommend avoiding any activity that might result in harm to yourself and/or others in the event of altered awareness and/or loss of consciousness. These include (but are not limited to) driving, climbing ladders, swimming unsupervised, hot-tub bathing, operating heavy machinery; for at least 3-6 months seizure-free  Return in 6 months

## 2024-10-18 DIAGNOSIS — R56.9 SEIZURE (MULTI): ICD-10-CM

## 2024-10-18 NOTE — TELEPHONE ENCOUNTER
Pt needs his prescription for one month only. He can only afford one month of levETIRAcetam (Keppra) 500 mg tablet at a time.

## 2024-10-22 RX ORDER — LEVETIRACETAM 500 MG/1
500 TABLET ORAL 2 TIMES DAILY
Qty: 60 TABLET | Refills: 5 | Status: SHIPPED | OUTPATIENT
Start: 2024-10-22

## 2025-03-25 ENCOUNTER — APPOINTMENT (OUTPATIENT)
Dept: NEUROLOGY | Facility: CLINIC | Age: 45
End: 2025-03-25
Payer: COMMERCIAL

## 2025-03-25 VITALS — HEIGHT: 73 IN | BODY MASS INDEX: 31.88 KG/M2

## 2025-03-25 DIAGNOSIS — R56.9 SEIZURE (MULTI): Primary | ICD-10-CM

## 2025-03-25 PROCEDURE — 99213 OFFICE O/P EST LOW 20 MIN: CPT | Performed by: STUDENT IN AN ORGANIZED HEALTH CARE EDUCATION/TRAINING PROGRAM

## 2025-03-25 PROCEDURE — G2211 COMPLEX E/M VISIT ADD ON: HCPCS | Performed by: STUDENT IN AN ORGANIZED HEALTH CARE EDUCATION/TRAINING PROGRAM

## 2025-03-25 RX ORDER — BUPROPION HYDROCHLORIDE 150 MG/1
TABLET ORAL
COMMUNITY
Start: 2025-02-12 | End: 2025-03-25 | Stop reason: SINTOL

## 2025-03-25 NOTE — PROGRESS NOTES
"Subjective     Chris Zhong is a right handed  44 y.o. year old male who presents with Seizures. Patient is accompanied by: Other sister  Visit type: follow up visit     Virtual or Telephone Consent    An interactive audio and video telecommunication system which permits real time communications between the patient (at the originating site) and provider (at the distant site) was utilized to provide this telehealth service.   Verbal consent was requested and obtained from Chris Zhong on this date, 03/25/25 for a telehealth visit and the patient's location was confirmed at the time of the visit.     He has only been taking  mg once a day as he has been too busy to take twice a day. He has not had any side effects. He has not had any seizures nor any auras. He     Prior HX:  On 9/4 on his drive to work, he suddently felt lightheaded associated with a \"unique feeling of nostalgia\" or perhaps anusha vu, lasting around 30 seconds. By the time he arrived at work he was back to normal, however he suddenly lost consciousness and woke up in the ambulance. He did not recall incontinence tongue biting, nor myalgia. His cousin reported convulsing, generalized stiffness and shaking, with foaming. His eyes were closed and he was unresponsive. He timed it and the shaking lasted 3.5 minutes. Afterwards he was observed to be breathing heavily, followed by confusion with inability to communicate for another 10 minutes until EMS arrived and he slowly came to.    The patient believes the seizure was due to vaping which he has since stopped.    He had another seizure 15 years ago which he was told was related to benzo and/or opiate withdrawal. He was prescribed Keppra and VPA but did not take it.    Since [facility], he has been taking  BID without any side effects.    He had an unremarkable birth and developmental history, no history of CNS infection or trauma, nor any family history of seizure/epilepsy. He works as a " [employer]. He drives.    Patient Active Problem List   Diagnosis    Seizure-like activity (Multi)    Dental abscess    Periodontal disease    Essential hypertension      Past Medical History:   Diagnosis Date    Former cigarette smoker       No past surgical history on file.   Social History     Socioeconomic History    Marital status: Unknown     Spouse name: Not on file    Number of children: Not on file    Years of education: Not on file    Highest education level: Not on file   Occupational History    Not on file   Tobacco Use    Smoking status: Former     Current packs/day: 1.00     Average packs/day: 1 pack/day for 4.2 years (4.2 ttl pk-yrs)     Types: Cigarettes     Start date: 2021     Quit date: 2001    Smokeless tobacco: Never   Vaping Use    Vaping status: Every Day    Substances: Nicotine, Flavoring    Devices: Disposable, Pre-filled or refillable cartridge   Substance and Sexual Activity    Alcohol use: Never    Drug use: Yes     Types: Marijuana    Sexual activity: Defer   Other Topics Concern    Not on file   Social History Narrative    Not on file     Social Drivers of Health     Financial Resource Strain: Low Risk  (9/5/2024)    Overall Financial Resource Strain (CARDIA)     Difficulty of Paying Living Expenses: Not hard at all   Food Insecurity: Not on file   Transportation Needs: No Transportation Needs (9/5/2024)    PRAPARE - Transportation     Lack of Transportation (Medical): No     Lack of Transportation (Non-Medical): No   Physical Activity: Not on file   Stress: Not on file   Social Connections: Not on file   Intimate Partner Violence: Not on file   Housing Stability: Low Risk  (9/5/2024)    Housing Stability Vital Sign     Unable to Pay for Housing in the Last Year: No     Number of Times Moved in the Last Year: 1     Homeless in the Last Year: No      No family history on file.              Review of Systems  All other system have been reviewed and are negative for complaint.    Vitals:     "03/25/25 1253   Height: 1.854 m (6' 1\")     Objective  (prior)  Neurological Exam  Mental Status  Awake, alert and oriented to person, place and time. Speech is normal. Language is fluent with no aphasia. Attention and concentration are normal.    Cranial Nerves  CN II: Visual fields full to confrontation.  CN III, IV, VI: Extraocular movements intact bilaterally. Normal saccades. Normal smooth pursuit. Normal lids and orbits bilaterally. Pupils equal round and reactive to light bilaterally.  CN V:  Right: Facial sensation is normal.  Left: Facial sensation is normal on the left.  CN VII: Full and symmetric facial movement.  CN VIII: Hearing is normal.  CN IX, X: Palate elevates symmetrically  CN XI: Shoulder shrug strength is normal.  CN XII: Tongue midline without atrophy or fasciculations.    Motor  Normal muscle bulk throughout. No fasciculations present. Normal muscle tone. No abnormal involuntary movements.                                               Right                     Left   Shoulder abduction               5                          5  Elbow flexion                         5                          5  Elbow extension                    5                          5  Finger flexion                         5                          5  Finger extension                    5                          5  Finger abduction                    5                          5  Hip flexion                              5                          5  Knee flexion                           5                          5  Plantarflexion                         5                          5  Dorsiflexion                            5                          5    Sensory  Light touch is normal in upper and lower extremities. Normal vibration in distal lower extremities.     Reflexes                                            Right                      Left  Biceps                                 2+                         " 2+  Triceps                                2+                         2+  Patellar                                2+                         2+  Achilles                                2+                         2+  Right Plantar: downgoing  Left Plantar: downgoing    Right pathological reflexes: Kolby's absent.  Left pathological reflexes: Kolby's absent.    Coordination  Right: Finger-to-nose normal.Left: Finger-to-nose normal.    Gait  Casual gait is normal including stance, stride, and arm swing. Normal tandem gait. Romberg is absent.                             Imaging Results: EEG 09/04/2024 - read as normal    Impression  IMPRESSION    Impression  The awake and sleep routine EEG is normal. No epileptiform activity or lateralizing signs.    A full report will be scanned into the patient's chart at a later time.      This report has been interpreted and electronically signed by    MR brain w and wo IV contrast 09/04/2024    Narrative  Interpreted By:  Pito Harrison,  STUDY:  MR BRAIN W AND WO IV CONTRAST;  9/4/2024 9:42 pm    INDICATION:  Signs/Symptoms:seizure like activity.      COMPARISON:  Correlation made to noncontrast head CT of 09/04/2024    ACCESSION NUMBER(S):  [id number]    ORDERING CLINICIAN:  ZEN RUTHERFORD    TECHNIQUE:  Axial T2, FLAIR, DWI, gradient echo T2 and sagittal and coronal T1  weighted images of brain were acquired. Post contrast T1 weighted  images were acquired after administration of 20 ML Dotarem gadolinium  based intravenous contrast.    FINDINGS:  CSF Spaces: The ventricles, sulci and basal cisterns are within  normal limits.    Parenchyma: There is no diffusion restriction abnormality to suggest  acute infarct.  There is no focal parenchymal signal abnormality. No  evidence of intracranial hemorrhage. There is no mass effect or  midline shift.    Paranasal Sinuses and Mastoids: Visualized paranasal sinuses and  mastoid air cells are unremarkable.    Incidental left  parietal developmental venous anomaly. No abnormal  enhancement.    Impression  No evidence of acute infarct, intracranial mass effect or midline  shift. No abnormal enhancement.    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 year old RH male with h/o opioid use disorder on suboxone, here for followup evaluation of probable seizure - once in Sept 2024 (unprovoked), and one 15 years ago in the setting of polysubstance intoxication. He is convinced the more recent one was related to vaping which he has stopped. I discussed options of stopping Keppra (which I recommended driving precautions) versus continuing the medicine and he will do the latter. He declined switching to XR and will try his best take it twice a day as prescribed.    We discussed the following plan today:   Continue Keppra 500 mg twice a day  Stop buproprion  Return in 9 months virtual

## 2025-03-25 NOTE — PATIENT INSTRUCTIONS
Thank you for your visit today. You were seen by Dr. Varghese for seizure. If you have any questions or need to reach me, call my office at [phone].    We discussed the following plan today:   Continue Keppra 500 mg twice a day  Stop buproprion  Return in 9 months virtual

## 2025-03-26 ENCOUNTER — TELEPHONE (OUTPATIENT)
Dept: NEUROLOGY | Facility: CLINIC | Age: 45
End: 2025-03-26
Payer: COMMERCIAL

## 2025-03-26 NOTE — TELEPHONE ENCOUNTER
Called pt to schedule appt, he stated he spoke with someone yesterday and advised them to call him after 2p. I asked him to give us call when he is available to schedule his 9 month virtual appt with .

## 2025-12-30 ENCOUNTER — APPOINTMENT (OUTPATIENT)
Dept: NEUROLOGY | Facility: CLINIC | Age: 45
End: 2025-12-30
Payer: COMMERCIAL